# Patient Record
Sex: FEMALE | Race: WHITE | NOT HISPANIC OR LATINO | Employment: OTHER | URBAN - NONMETROPOLITAN AREA
[De-identification: names, ages, dates, MRNs, and addresses within clinical notes are randomized per-mention and may not be internally consistent; named-entity substitution may affect disease eponyms.]

---

## 2018-07-06 RX ORDER — ESTRADIOL 1 MG/1
1 TABLET ORAL DAILY
COMMUNITY
End: 2022-02-17

## 2018-07-06 RX ORDER — LANOLIN ALCOHOL/MO/W.PET/CERES
1000 CREAM (GRAM) TOPICAL DAILY
COMMUNITY

## 2018-07-06 RX ORDER — SODIUM PHOSPHATE,MONO-DIBASIC 19G-7G/118
1 ENEMA (ML) RECTAL 2 TIMES DAILY WITH MEALS
COMMUNITY
End: 2022-02-17

## 2018-07-10 ENCOUNTER — HOSPITAL ENCOUNTER (OUTPATIENT)
Facility: HOSPITAL | Age: 73
Setting detail: HOSPITAL OUTPATIENT SURGERY
Discharge: HOME | End: 2018-07-10
Attending: OPHTHALMOLOGY | Admitting: OPHTHALMOLOGY

## 2018-07-10 VITALS
DIASTOLIC BLOOD PRESSURE: 58 MMHG | SYSTOLIC BLOOD PRESSURE: 127 MMHG | HEART RATE: 74 BPM | OXYGEN SATURATION: 97 % | WEIGHT: 139 LBS | RESPIRATION RATE: 16 BRPM | BODY MASS INDEX: 25.58 KG/M2 | TEMPERATURE: 99 F | HEIGHT: 62 IN

## 2018-07-10 PROBLEM — H26.492 POSTERIOR CAPSULAR OPACIFICATION OF LEFT EYE, OBSCURING VISION: Status: ACTIVE | Noted: 2018-07-10

## 2018-07-10 PROBLEM — H26.492 POSTERIOR CAPSULAR OPACIFICATION OF LEFT EYE, OBSCURING VISION: Status: RESOLVED | Noted: 2018-07-10 | Resolved: 2018-07-10

## 2018-07-10 RX ORDER — TETRACAINE HYDROCHLORIDE 5 MG/ML
SOLUTION OPHTHALMIC AS NEEDED
Status: DISCONTINUED | OUTPATIENT
Start: 2018-07-10 | End: 2018-07-10 | Stop reason: HOSPADM

## 2018-07-10 RX ORDER — CYCLOPENTOLATE HYDROCHLORIDE 20 MG/ML
1 SOLUTION/ DROPS OPHTHALMIC ONCE
Status: COMPLETED | OUTPATIENT
Start: 2018-07-10 | End: 2018-07-10

## 2018-07-10 RX ORDER — PREDNISOLONE ACETATE 10 MG/ML
SUSPENSION/ DROPS OPHTHALMIC AS NEEDED
Status: DISCONTINUED | OUTPATIENT
Start: 2018-07-10 | End: 2018-07-10 | Stop reason: HOSPADM

## 2018-07-10 RX ORDER — BALANCED SALT SOLUTION 6.4; .75; .48; .3; 3.9; 1.7 MG/ML; MG/ML; MG/ML; MG/ML; MG/ML; MG/ML
SOLUTION OPHTHALMIC AS NEEDED
Status: DISCONTINUED | OUTPATIENT
Start: 2018-07-10 | End: 2018-07-10 | Stop reason: HOSPADM

## 2018-07-10 RX ORDER — PHENYLEPHRINE HYDROCHLORIDE 100 MG/ML
1 SOLUTION/ DROPS OPHTHALMIC ONCE
Status: COMPLETED | OUTPATIENT
Start: 2018-07-10 | End: 2018-07-10

## 2018-07-10 RX ADMIN — APRACLONIDINE HYDROCHLORIDE 1 DROP: 10 SOLUTION/ DROPS OPHTHALMIC at 13:59

## 2018-07-10 RX ADMIN — CYCLOPENTOLATE HYDROCHLORIDE 1 DROP: 20 SOLUTION/ DROPS OPHTHALMIC at 13:56

## 2018-07-10 RX ADMIN — PHENYLEPHRINE HYDROCHLORIDE 1 DROP: 100 SOLUTION/ DROPS OPHTHALMIC at 13:56

## 2018-07-10 NOTE — OP NOTE
Pre-op Diagnosis: Posterior Capsular Opacification, Left eye  Procedure: Nd: YAG Laser Capsulotomy, Left eye    Post-op Diagnosis: Posterior Capsular Opacification, Left eye    Indications:  Taniya Thomas is a 72 y.o. female with Posterior Capsular Opacification. It was recommended that the next step in her management be a Nd:YAG laser capsulotomy in the left eye.      Procedure: The left eye was dilated prior to arrival in the laser suite.  A drop of topical Tetracaine was instilled in the left conjunctival cul-de-sac and the patient was then positioned in front of the Nd:YAG laser. The correct eye was again confirmed verbally by both the patient and the surgeon.  The laser contact lens was positioned on the left eye and the capsulotomy was carried out without difficulty using the following parameters:    Laser: Nd:YAG  Spot Size: Fixed  Burst Mode: I  Power Settin.7 mJ/burst  Number of shots: 23  Total energy delivered: 117 mJ    Immediately following the procedure, the left eye was rinsed with BSS solution and a drop of prednisolone acetate 1% was applied.    The patient tolerated the procedure without difficulty. No complications were encountered.    The patient was discharged home with the appropriate instructions.    Jeri Singh MD   7/10/2018  2:37 PM

## 2018-07-17 ENCOUNTER — HOSPITAL ENCOUNTER (OUTPATIENT)
Facility: HOSPITAL | Age: 73
Setting detail: HOSPITAL OUTPATIENT SURGERY
Discharge: HOME | End: 2018-07-17
Attending: OPHTHALMOLOGY | Admitting: OPHTHALMOLOGY

## 2018-07-17 VITALS
OXYGEN SATURATION: 98 % | TEMPERATURE: 97.1 F | HEIGHT: 62 IN | BODY MASS INDEX: 25.58 KG/M2 | WEIGHT: 139 LBS | SYSTOLIC BLOOD PRESSURE: 135 MMHG | DIASTOLIC BLOOD PRESSURE: 60 MMHG | RESPIRATION RATE: 18 BRPM | HEART RATE: 67 BPM

## 2018-07-17 PROBLEM — Z96.1 PRESENCE OF INTRAOCULAR LENS: Status: ACTIVE | Noted: 2018-07-17

## 2018-07-17 PROBLEM — H26.491 OTHER SECONDARY CATARACT, RIGHT EYE: Status: ACTIVE | Noted: 2018-07-17

## 2018-07-17 PROBLEM — H26.491 OTHER SECONDARY CATARACT, RIGHT EYE: Status: RESOLVED | Noted: 2018-07-17 | Resolved: 2018-07-17

## 2018-07-17 PROBLEM — H26.492 POSTERIOR CAPSULAR OPACIFICATION OF LEFT EYE, OBSCURING VISION: Status: RESOLVED | Noted: 2018-07-10 | Resolved: 2018-07-17

## 2018-07-17 RX ORDER — TETRACAINE HYDROCHLORIDE 5 MG/ML
SOLUTION OPHTHALMIC AS NEEDED
Status: DISCONTINUED | OUTPATIENT
Start: 2018-07-17 | End: 2018-07-17 | Stop reason: HOSPADM

## 2018-07-17 RX ORDER — PHENYLEPHRINE HYDROCHLORIDE 100 MG/ML
1 SOLUTION/ DROPS OPHTHALMIC ONCE
Status: COMPLETED | OUTPATIENT
Start: 2018-07-17 | End: 2018-07-17

## 2018-07-17 RX ORDER — BALANCED SALT SOLUTION 6.4; .75; .48; .3; 3.9; 1.7 MG/ML; MG/ML; MG/ML; MG/ML; MG/ML; MG/ML
SOLUTION OPHTHALMIC AS NEEDED
Status: DISCONTINUED | OUTPATIENT
Start: 2018-07-17 | End: 2018-07-17 | Stop reason: HOSPADM

## 2018-07-17 RX ORDER — PREDNISOLONE ACETATE 10 MG/ML
SUSPENSION/ DROPS OPHTHALMIC AS NEEDED
Status: DISCONTINUED | OUTPATIENT
Start: 2018-07-17 | End: 2018-07-17 | Stop reason: HOSPADM

## 2018-07-17 RX ORDER — CYCLOPENTOLATE HYDROCHLORIDE 20 MG/ML
1 SOLUTION/ DROPS OPHTHALMIC ONCE
Status: COMPLETED | OUTPATIENT
Start: 2018-07-17 | End: 2018-07-17

## 2018-07-17 RX ADMIN — PHENYLEPHRINE HYDROCHLORIDE 1 DROP: 100 SOLUTION/ DROPS OPHTHALMIC at 14:20

## 2018-07-17 RX ADMIN — APRACLONIDINE HYDROCHLORIDE 1 DROP: 10 SOLUTION/ DROPS OPHTHALMIC at 14:29

## 2018-07-17 RX ADMIN — CYCLOPENTOLATE HYDROCHLORIDE 1 DROP: 20 SOLUTION/ DROPS OPHTHALMIC at 14:20

## 2022-01-18 ENCOUNTER — PREP FOR SURGERY (OUTPATIENT)
Dept: OTHER | Facility: HOSPITAL | Age: 77
End: 2022-01-18

## 2022-01-18 ENCOUNTER — HOSPITAL ENCOUNTER (OUTPATIENT)
Dept: GENERAL RADIOLOGY | Facility: HOSPITAL | Age: 77
Discharge: HOME | End: 2022-01-18

## 2022-01-18 ENCOUNTER — OFFICE VISIT (OUTPATIENT)
Dept: OBSTETRICS AND GYNECOLOGY | Facility: CLINIC | Age: 77
End: 2022-01-18

## 2022-01-18 ENCOUNTER — PRE-ADMISSION TESTING (OUTPATIENT)
Dept: PREADMISSION TESTING | Facility: HOSPITAL | Age: 77
End: 2022-01-18

## 2022-01-18 VITALS
WEIGHT: 146 LBS | DIASTOLIC BLOOD PRESSURE: 80 MMHG | BODY MASS INDEX: 26.87 KG/M2 | SYSTOLIC BLOOD PRESSURE: 130 MMHG | HEIGHT: 62 IN

## 2022-01-18 VITALS — HEIGHT: 62 IN | WEIGHT: 146.2 LBS | BODY MASS INDEX: 26.91 KG/M2

## 2022-01-18 DIAGNOSIS — N95.0 PMB (POSTMENOPAUSAL BLEEDING): Primary | ICD-10-CM

## 2022-01-18 DIAGNOSIS — R93.89 THICKENED ENDOMETRIUM: ICD-10-CM

## 2022-01-18 DIAGNOSIS — N95.0 PMB (POSTMENOPAUSAL BLEEDING): ICD-10-CM

## 2022-01-18 LAB
ALBUMIN SERPL-MCNC: 4.3 G/DL (ref 3.5–5.2)
ALBUMIN/GLOB SERPL: 1.3 G/DL
ALP SERPL-CCNC: 90 U/L (ref 39–117)
ALT SERPL W P-5'-P-CCNC: 15 U/L (ref 1–33)
ANION GAP SERPL CALCULATED.3IONS-SCNC: 10.4 MMOL/L (ref 5–15)
AST SERPL-CCNC: 26 U/L (ref 1–32)
BACTERIA UR QL AUTO: ABNORMAL /HPF
BASOPHILS # BLD AUTO: 0.08 10*3/MM3 (ref 0–0.2)
BASOPHILS NFR BLD AUTO: 0.7 % (ref 0–1.5)
BILIRUB SERPL-MCNC: 0.2 MG/DL (ref 0–1.2)
BILIRUB UR QL STRIP: NEGATIVE
BUN SERPL-MCNC: 16 MG/DL (ref 8–23)
BUN/CREAT SERPL: 18 (ref 7–25)
CALCIUM SPEC-SCNC: 9.9 MG/DL (ref 8.6–10.5)
CHLORIDE SERPL-SCNC: 102 MMOL/L (ref 98–107)
CLARITY UR: CLEAR
CO2 SERPL-SCNC: 25.6 MMOL/L (ref 22–29)
COLOR UR: YELLOW
CREAT SERPL-MCNC: 0.89 MG/DL (ref 0.57–1)
DEPRECATED RDW RBC AUTO: 43.5 FL (ref 37–54)
EOSINOPHIL # BLD AUTO: 0.06 10*3/MM3 (ref 0–0.4)
EOSINOPHIL NFR BLD AUTO: 0.5 % (ref 0.3–6.2)
ERYTHROCYTE [DISTWIDTH] IN BLOOD BY AUTOMATED COUNT: 13 % (ref 12.3–15.4)
GFR SERPL CREATININE-BSD FRML MDRD: 61 ML/MIN/1.73
GLOBULIN UR ELPH-MCNC: 3.4 GM/DL
GLUCOSE SERPL-MCNC: 87 MG/DL (ref 65–99)
GLUCOSE UR STRIP-MCNC: NEGATIVE MG/DL
HCT VFR BLD AUTO: 42.9 % (ref 34–46.6)
HGB BLD-MCNC: 13.7 G/DL (ref 12–15.9)
HGB UR QL STRIP.AUTO: ABNORMAL
HYALINE CASTS UR QL AUTO: ABNORMAL /LPF
IMM GRANULOCYTES # BLD AUTO: 0.05 10*3/MM3 (ref 0–0.05)
IMM GRANULOCYTES NFR BLD AUTO: 0.4 % (ref 0–0.5)
KETONES UR QL STRIP: NEGATIVE
LEUKOCYTE ESTERASE UR QL STRIP.AUTO: NEGATIVE
LYMPHOCYTES # BLD AUTO: 2.48 10*3/MM3 (ref 0.7–3.1)
LYMPHOCYTES NFR BLD AUTO: 21.6 % (ref 19.6–45.3)
MCH RBC QN AUTO: 29.1 PG (ref 26.6–33)
MCHC RBC AUTO-ENTMCNC: 31.9 G/DL (ref 31.5–35.7)
MCV RBC AUTO: 91.1 FL (ref 79–97)
MONOCYTES # BLD AUTO: 0.72 10*3/MM3 (ref 0.1–0.9)
MONOCYTES NFR BLD AUTO: 6.3 % (ref 5–12)
NEUTROPHILS NFR BLD AUTO: 70.5 % (ref 42.7–76)
NEUTROPHILS NFR BLD AUTO: 8.09 10*3/MM3 (ref 1.7–7)
NITRITE UR QL STRIP: NEGATIVE
NRBC BLD AUTO-RTO: 0 /100 WBC (ref 0–0.2)
PH UR STRIP.AUTO: <=5 [PH] (ref 5–8)
PLATELET # BLD AUTO: 373 10*3/MM3 (ref 140–450)
PMV BLD AUTO: 9 FL (ref 6–12)
POTASSIUM SERPL-SCNC: 5.5 MMOL/L (ref 3.5–5.2)
PROT SERPL-MCNC: 7.7 G/DL (ref 6–8.5)
PROT UR QL STRIP: ABNORMAL
RBC # BLD AUTO: 4.71 10*6/MM3 (ref 3.77–5.28)
RBC # UR STRIP: ABNORMAL /HPF
REF LAB TEST METHOD: ABNORMAL
SODIUM SERPL-SCNC: 138 MMOL/L (ref 136–145)
SP GR UR STRIP: 1.02 (ref 1–1.03)
SQUAMOUS #/AREA URNS HPF: ABNORMAL /HPF
UROBILINOGEN UR QL STRIP: ABNORMAL
WBC # UR STRIP: ABNORMAL /HPF
WBC NRBC COR # BLD: 11.48 10*3/MM3 (ref 3.4–10.8)

## 2022-01-18 PROCEDURE — 99204 OFFICE O/P NEW MOD 45 MIN: CPT | Performed by: OBSTETRICS & GYNECOLOGY

## 2022-01-18 PROCEDURE — 80053 COMPREHEN METABOLIC PANEL: CPT

## 2022-01-18 PROCEDURE — 36415 COLL VENOUS BLD VENIPUNCTURE: CPT

## 2022-01-18 PROCEDURE — 71046 X-RAY EXAM CHEST 2 VIEWS: CPT

## 2022-01-18 PROCEDURE — 93005 ELECTROCARDIOGRAM TRACING: CPT

## 2022-01-18 PROCEDURE — 81001 URINALYSIS AUTO W/SCOPE: CPT

## 2022-01-18 PROCEDURE — 85025 COMPLETE CBC W/AUTO DIFF WBC: CPT

## 2022-01-18 PROCEDURE — 93010 ELECTROCARDIOGRAM REPORT: CPT | Performed by: INTERNAL MEDICINE

## 2022-01-18 RX ORDER — MULTIPLE VITAMINS W/ MINERALS TAB 9MG-400MCG
1 TAB ORAL DAILY
COMMUNITY

## 2022-01-18 RX ORDER — LORATADINE 10 MG/1
10 TABLET ORAL DAILY
COMMUNITY

## 2022-01-18 RX ORDER — ASPIRIN 81 MG/1
81 TABLET ORAL DAILY
COMMUNITY

## 2022-01-18 RX ORDER — SODIUM CHLORIDE 0.9 % (FLUSH) 0.9 %
3 SYRINGE (ML) INJECTION EVERY 12 HOURS SCHEDULED
Status: CANCELLED | OUTPATIENT
Start: 2022-01-18

## 2022-01-18 RX ORDER — SODIUM CHLORIDE 0.9 % (FLUSH) 0.9 %
10 SYRINGE (ML) INJECTION AS NEEDED
Status: CANCELLED | OUTPATIENT
Start: 2022-01-18

## 2022-01-18 RX ORDER — IBUPROFEN 200 MG
200 TABLET ORAL EVERY 6 HOURS PRN
COMMUNITY
End: 2022-02-21 | Stop reason: HOSPADM

## 2022-01-18 NOTE — PAT
"Called anesthesia phone.  Juan Pablo Landin CRNA came over to [facility].  Pt to have a procedure on 1/27/22 with Dr. Wilde.  Reviewed pt's PMH with CRNA, along with preliminary EKG done in [facility] today that reads \"cannot rule out anterior infarct, age undetermined\".  Pt stated that she has never seen a cardiologist, and doesn't remember the last time that she had an EKG.  States that she does normal household chores, takes care of her animals, uses a weed eater in the summertime, and shovels snow, without any chest pain or problems.  Asked Juan Pablo if anything further needed prior to the D&C.  Juan Pablo stated that pt was ok to proceed.    "

## 2022-01-19 LAB
QT INTERVAL: 394 MS
QTC INTERVAL: 445 MS

## 2022-01-24 ENCOUNTER — LAB (OUTPATIENT)
Dept: LAB | Facility: HOSPITAL | Age: 77
End: 2022-01-24

## 2022-01-24 ENCOUNTER — TRANSCRIBE ORDERS (OUTPATIENT)
Dept: LAB | Facility: HOSPITAL | Age: 77
End: 2022-01-24

## 2022-01-24 DIAGNOSIS — Z11.59 SCREENING EXAMINATION FOR OTHER ARTHROPOD-BORNE VIRAL DISEASES: Primary | ICD-10-CM

## 2022-01-24 DIAGNOSIS — Z11.59 SCREENING EXAMINATION FOR OTHER ARTHROPOD-BORNE VIRAL DISEASES: ICD-10-CM

## 2022-01-24 LAB — SARS-COV-2 RNA PNL SPEC NAA+PROBE: NOT DETECTED

## 2022-01-24 PROCEDURE — U0004 COV-19 TEST NON-CDC HGH THRU: HCPCS

## 2022-01-24 PROCEDURE — C9803 HOPD COVID-19 SPEC COLLECT: HCPCS

## 2022-01-24 PROCEDURE — U0005 INFEC AGEN DETEC AMPLI PROBE: HCPCS

## 2022-01-28 NOTE — PROGRESS NOTES
"GYN Office Visit    Subjective   Chief Complaint   Patient presents with   • Consult     Patient complains of PMB and cramping that started about 3 weeks ago.   • Vaginal Bleeding     Taniya Thomas is a 76 y.o. year old  presenting to be seen for postmenopausal bleeding.    She reports bleeding that started 3 weeks ago.  Bleeding is moderate to heavy.  She does have cramping back pain.  She has been using unopposed estrogen for several years now.    OB Hx: 2 vaginal births  Pap smear:   Mammogram:     Past Medical History:   Diagnosis Date   • Arthritis     OA   • Body piercing     \"EARS ARE GROWN UP\"   • Cancer (HCC)     SKIN CANCER REMOVED FROM FACE   • Cataract, bilateral     s/p surgery   • GERD (gastroesophageal reflux disease)    • Hearing loss     bilateral hearing aids   • History of bronchitis    • History of cardiovascular stress test     REPORTS SEVERAL TEST. LAST DONE 25-30 YEARS AGO OR LONGER   • History of migraine    • Irregular heart beat     states had an episode in the summer of  that her heart was racing, skipping beats, and vision went black.  stated it didn't last long and she never sought treatment.    • Migraine    • Osteoarthritis    • Ovarian cyst    • PONV (postoperative nausea and vomiting)    • Recurrent pregnancy loss, antepartum condition or complication     2   • Seasonal allergies    • Wears dentures     upper plate    • Wears glasses     FOR READING       Past Surgical History:   Procedure Laterality Date   • APPENDECTOMY     • BREAST BIOPSY     • BREAST SURGERY Bilateral     BIOPSIES - benign   • COLONOSCOPY     • DILATATION AND CURETTAGE      REPORTS SEVERAL IN THE PAST   • EYE CAPSULOTOMY WITH LASER Left 7/10/2018    Procedure: EYE CAPSULOTOMY WITH LASER LEFT;  Surgeon: Jeri Singh MD;  Location: [facility];  Service: Ophthalmology   • EYE CAPSULOTOMY WITH LASER Right 2018    Procedure: EYE CAPSULOTOMY WITH LASER RIGHT;  Surgeon: Jeri" "Rayna Singh MD;  Location: [facility] OR;  Service: Ophthalmology   • EYE SURGERY Bilateral     CATARACT EXTRACTIONS   • MOUTH SURGERY      ORAL EXTRACTIONS   • SALPINGO OOPHORECTOMY Left    • SKIN BIOPSY      REPORTS REMOVED FROM FACE       Family History   Problem Relation Age of Onset   • Stroke Father    • Hypertension Father    • Skin cancer Father    • Heart disease Mother    • Hypertension Mother    • Hypertension Brother    • Lung cancer Brother    • Hypertension Sister    • Cervical cancer Maternal Aunt         Social History     Tobacco Use   • Smoking status: Never Smoker   • Smokeless tobacco: Never Used   Vaping Use   • Vaping Use: Never used   Substance Use Topics   • Alcohol use: No   • Drug use: No       (Not in a hospital admission)      Codeine and Darvon [propoxyphene]    Current Outpatient Medications on File Prior to Visit   Medication Sig Dispense Refill   • estradiol (ESTRACE) 1 MG tablet Take 1 mg by mouth Daily.     • multivitamin with minerals (CENTRUM SILVER 50+WOMEN PO) Take 1 tablet by mouth Daily.     • vitamin B-12 (CYANOCOBALAMIN) 1000 MCG tablet Take 1,000 mcg by mouth Daily.     • glucosamine-chondroitin 500-400 MG capsule capsule Take 1 capsule by mouth 2 (Two) Times a Day With Meals.     • Prenatal Multivit-Min-Fe-FA (PRENATAL VITAMINS PO) Take 1 tablet by mouth Daily.       No current facility-administered medications on file prior to visit.       Social History    Tobacco Use      Smoking status: Never Smoker      Smokeless tobacco: Never Used         Objective   /80   Ht 156.8 cm (61.75\")   Wt 66.2 kg (146 lb)   LMP  (LMP Unknown)   BMI 26.92 kg/m²     Physical Exam:  General Appearance: alert, pleasant, appears stated age, interactive and cooperative         Medical Decision Making:    I reviewed her pelvic ultrasound from today.    Normal sized, anteverted uterus with multiple, small < 13 mm fibroids present.  Uterine length measures 7.4 cm.  Endometrium is " thickened measuring 8 mm.  Previous LSO.  Right ovary is not well-visualized.  No free fluid in the cul-de-sac.    Assessment   Postmenopausal bleeding  Thickened endometrium  Unopposed estrogen     Plan    Orders Placed This Encounter   Procedures   • US Non-ob Transvaginal     Order Specific Question:   Reason for Exam:     Answer:   PMB       Medication Management: Stop estrogen    Procedures Performed: None    We reviewed her situation in detail today.  We reviewed her ultrasound findings.  We discussed the need for endometrial sampling to rule out hyperplasia/malignancy.  We discussed in-office endometrial biopsy versus hysteroscopic evaluation in the OR under sedation.  After reviewing the risks and benefits of each approach, the patient opted for hysteroscopy, D&C under sedation in the OR.  Risks for the procedure were reviewed today.  Stop unopposed estrogen use.  All questions answered.    Jeyson Wilde MD  Obstetrics and Gynecology  [facility]

## 2022-02-17 ENCOUNTER — PRE-ADMISSION TESTING (OUTPATIENT)
Dept: PREADMISSION TESTING | Facility: HOSPITAL | Age: 77
End: 2022-02-17

## 2022-02-17 VITALS — BODY MASS INDEX: 27.2 KG/M2 | HEIGHT: 62 IN | WEIGHT: 147.8 LBS

## 2022-02-17 LAB
BASOPHILS # BLD AUTO: 0.05 10*3/MM3 (ref 0–0.2)
BASOPHILS NFR BLD AUTO: 0.6 % (ref 0–1.5)
BILIRUB UR QL STRIP: NEGATIVE
CLARITY UR: CLEAR
COLOR UR: YELLOW
DEPRECATED RDW RBC AUTO: 41.1 FL (ref 37–54)
EOSINOPHIL # BLD AUTO: 0.11 10*3/MM3 (ref 0–0.4)
EOSINOPHIL NFR BLD AUTO: 1.3 % (ref 0.3–6.2)
ERYTHROCYTE [DISTWIDTH] IN BLOOD BY AUTOMATED COUNT: 12.6 % (ref 12.3–15.4)
GLUCOSE UR STRIP-MCNC: NEGATIVE MG/DL
HCT VFR BLD AUTO: 41.4 % (ref 34–46.6)
HGB BLD-MCNC: 13.4 G/DL (ref 12–15.9)
HGB UR QL STRIP.AUTO: NEGATIVE
IMM GRANULOCYTES # BLD AUTO: 0.02 10*3/MM3 (ref 0–0.05)
IMM GRANULOCYTES NFR BLD AUTO: 0.2 % (ref 0–0.5)
KETONES UR QL STRIP: NEGATIVE
LEUKOCYTE ESTERASE UR QL STRIP.AUTO: NEGATIVE
LYMPHOCYTES # BLD AUTO: 3.24 10*3/MM3 (ref 0.7–3.1)
LYMPHOCYTES NFR BLD AUTO: 38.2 % (ref 19.6–45.3)
MCH RBC QN AUTO: 29.1 PG (ref 26.6–33)
MCHC RBC AUTO-ENTMCNC: 32.4 G/DL (ref 31.5–35.7)
MCV RBC AUTO: 89.8 FL (ref 79–97)
MONOCYTES # BLD AUTO: 0.82 10*3/MM3 (ref 0.1–0.9)
MONOCYTES NFR BLD AUTO: 9.7 % (ref 5–12)
NEUTROPHILS NFR BLD AUTO: 4.25 10*3/MM3 (ref 1.7–7)
NEUTROPHILS NFR BLD AUTO: 50 % (ref 42.7–76)
NITRITE UR QL STRIP: NEGATIVE
NRBC BLD AUTO-RTO: 0 /100 WBC (ref 0–0.2)
PH UR STRIP.AUTO: 5.5 [PH] (ref 5–8)
PLATELET # BLD AUTO: 353 10*3/MM3 (ref 140–450)
PMV BLD AUTO: 9 FL (ref 6–12)
PROT UR QL STRIP: NEGATIVE
RBC # BLD AUTO: 4.61 10*6/MM3 (ref 3.77–5.28)
SP GR UR STRIP: 1.02 (ref 1–1.03)
UROBILINOGEN UR QL STRIP: NORMAL
WBC NRBC COR # BLD: 8.49 10*3/MM3 (ref 3.4–10.8)

## 2022-02-17 PROCEDURE — 36415 COLL VENOUS BLD VENIPUNCTURE: CPT

## 2022-02-17 PROCEDURE — 85025 COMPLETE CBC W/AUTO DIFF WBC: CPT

## 2022-02-17 PROCEDURE — 81003 URINALYSIS AUTO W/O SCOPE: CPT

## 2022-02-17 NOTE — DISCHARGE INSTRUCTIONS
PAT phone history completed with pt for upcoming procedure on      PAT PASS GIVEN/REVIEWED WITH PT.  VERBALIZED UNDERSTANDING OF THE FOLLOWING:  DO NOT EAT, DRINK, SMOKE, USE SMOKELESS TOBACCO OR CHEW GUM AFTER MIDNIGHT THE NIGHT BEFORE SURGERY.  THIS ALSO INCLUDES HARD CANDIES AND MINTS.    DO NOT SHAVE THE AREA TO BE OPERATED ON AT LEAST 48 HOURS PRIOR TO THE PROCEDURE.  DO NOT WEAR MAKE UP OR NAIL POLISH.  DO NOT LEAVE IN ANY PIERCING OR WEAR JEWELRY THE DAY OF SURGERY.      DO NOT USE ADHESIVES IF YOU WEAR DENTURES.    DO NOT WEAR EYE CONTACTS; BRING IN YOUR GLASSES.    ONLY TAKE MEDICATION THE MORNING OF YOUR PROCEDURE IF INSTRUCTED BY YOUR SURGEON WITH ENOUGH WATER TO SWALLOW THE MEDICATION.  IF YOUR SURGEON DID NOT SPECIFY WHICH MEDICATIONS TO TAKE, YOU WILL NEED TO CALL THEIR OFFICE FOR FURTHER INSTRUCTIONS AND DO AS THEY INSTRUCT.    LEAVE ANYTHING YOU CONSIDER VALUABLE AT HOME.    YOU WILL NEED TO ARRANGE FOR SOMEONE TO DRIVE YOU HOME AFTER SURGERY.  IT IS RECOMMENDED THAT YOU DO NOT DRIVE, WORK, DRINK ALCOHOL OR MAKE MAJOR DECISIONS FOR AT LEAST 24 HOURS AFTER YOUR PROCEDURE IS COMPLETE.      THE DAY OF YOUR PROCEDURE, BRING IN THE FOLLOWING IF APPLICABLE:   PICTURE ID AND INSURANCE/MEDICARE OR MEDICAID CARDS/ANY CO-PAY THAT MAY BE DUE   COPY OF ADVANCED DIRECTIVE/LIVING WILL/POWER OR    CPAP/BIPAP/INHALERS   SKIN PREP SHEET   YOUR PREADMISSION TESTING PASS (IF NOT A PHONE HISTORY)

## 2022-02-21 ENCOUNTER — ANESTHESIA (OUTPATIENT)
Dept: PERIOP | Facility: HOSPITAL | Age: 77
End: 2022-02-21

## 2022-02-21 ENCOUNTER — ANESTHESIA EVENT (OUTPATIENT)
Dept: PERIOP | Facility: HOSPITAL | Age: 77
End: 2022-02-21

## 2022-02-21 ENCOUNTER — HOSPITAL ENCOUNTER (OUTPATIENT)
Facility: HOSPITAL | Age: 77
Setting detail: HOSPITAL OUTPATIENT SURGERY
Discharge: HOME | End: 2022-02-21
Attending: OBSTETRICS & GYNECOLOGY | Admitting: OBSTETRICS & GYNECOLOGY

## 2022-02-21 VITALS
SYSTOLIC BLOOD PRESSURE: 135 MMHG | RESPIRATION RATE: 16 BRPM | OXYGEN SATURATION: 97 % | HEART RATE: 87 BPM | TEMPERATURE: 97.4 F | DIASTOLIC BLOOD PRESSURE: 57 MMHG

## 2022-02-21 DIAGNOSIS — N95.0 PMB (POSTMENOPAUSAL BLEEDING): ICD-10-CM

## 2022-02-21 DIAGNOSIS — R93.89 THICKENED ENDOMETRIUM: ICD-10-CM

## 2022-02-21 PROCEDURE — 0 MEPERIDINE PER 100 MG: Performed by: NURSE ANESTHETIST, CERTIFIED REGISTERED

## 2022-02-21 PROCEDURE — 25010000002 ONDANSETRON PER 1 MG: Performed by: NURSE ANESTHETIST, CERTIFIED REGISTERED

## 2022-02-21 PROCEDURE — 25010000002 PROPOFOL 10 MG/ML EMULSION: Performed by: NURSE ANESTHETIST, CERTIFIED REGISTERED

## 2022-02-21 PROCEDURE — 58558 HYSTEROSCOPY BIOPSY: CPT | Performed by: OBSTETRICS & GYNECOLOGY

## 2022-02-21 PROCEDURE — S0260 H&P FOR SURGERY: HCPCS | Performed by: OBSTETRICS & GYNECOLOGY

## 2022-02-21 PROCEDURE — 88305 TISSUE EXAM BY PATHOLOGIST: CPT | Performed by: OBSTETRICS & GYNECOLOGY

## 2022-02-21 PROCEDURE — C1782 MORCELLATOR: HCPCS | Performed by: OBSTETRICS & GYNECOLOGY

## 2022-02-21 PROCEDURE — 25010000002 SUCCINYLCHOLINE PER 20 MG: Performed by: NURSE ANESTHETIST, CERTIFIED REGISTERED

## 2022-02-21 RX ORDER — HYDROCODONE BITARTRATE AND ACETAMINOPHEN 5; 325 MG/1; MG/1
1 TABLET ORAL ONCE AS NEEDED
Status: DISCONTINUED | OUTPATIENT
Start: 2022-02-21 | End: 2022-02-21 | Stop reason: HOSPADM

## 2022-02-21 RX ORDER — ONDANSETRON 2 MG/ML
INJECTION INTRAMUSCULAR; INTRAVENOUS AS NEEDED
Status: DISCONTINUED | OUTPATIENT
Start: 2022-02-21 | End: 2022-02-21 | Stop reason: SURG

## 2022-02-21 RX ORDER — SODIUM CHLORIDE 0.9 % (FLUSH) 0.9 %
3 SYRINGE (ML) INJECTION EVERY 12 HOURS SCHEDULED
Status: DISCONTINUED | OUTPATIENT
Start: 2022-02-21 | End: 2022-02-21 | Stop reason: HOSPADM

## 2022-02-21 RX ORDER — SODIUM CHLORIDE, SODIUM LACTATE, POTASSIUM CHLORIDE, CALCIUM CHLORIDE 600; 310; 30; 20 MG/100ML; MG/100ML; MG/100ML; MG/100ML
1000 INJECTION, SOLUTION INTRAVENOUS CONTINUOUS
Status: DISCONTINUED | OUTPATIENT
Start: 2022-02-21 | End: 2022-02-21 | Stop reason: HOSPADM

## 2022-02-21 RX ORDER — ACETAMINOPHEN 500 MG
1000 TABLET ORAL EVERY 8 HOURS PRN
Qty: 60 TABLET | Refills: 0 | Status: SHIPPED | OUTPATIENT
Start: 2022-02-21 | End: 2023-02-21

## 2022-02-21 RX ORDER — IBUPROFEN 600 MG/1
600 TABLET ORAL EVERY 6 HOURS PRN
Status: DISCONTINUED | OUTPATIENT
Start: 2022-02-21 | End: 2022-02-21 | Stop reason: HOSPADM

## 2022-02-21 RX ORDER — MEPERIDINE HYDROCHLORIDE 25 MG/ML
12.5 INJECTION INTRAMUSCULAR; INTRAVENOUS; SUBCUTANEOUS ONCE
Status: COMPLETED | OUTPATIENT
Start: 2022-02-21 | End: 2022-02-21

## 2022-02-21 RX ORDER — IBUPROFEN 800 MG/1
800 TABLET ORAL EVERY 8 HOURS PRN
Qty: 30 TABLET | Refills: 0 | Status: SHIPPED | OUTPATIENT
Start: 2022-02-21

## 2022-02-21 RX ORDER — SODIUM CHLORIDE 0.9 % (FLUSH) 0.9 %
10 SYRINGE (ML) INJECTION AS NEEDED
Status: DISCONTINUED | OUTPATIENT
Start: 2022-02-21 | End: 2022-02-21 | Stop reason: HOSPADM

## 2022-02-21 RX ORDER — SODIUM CHLORIDE 9 MG/ML
INJECTION, SOLUTION INTRAVENOUS CONTINUOUS PRN
Status: COMPLETED | OUTPATIENT
Start: 2022-02-21 | End: 2022-02-21

## 2022-02-21 RX ORDER — PROPOFOL 10 MG/ML
VIAL (ML) INTRAVENOUS AS NEEDED
Status: DISCONTINUED | OUTPATIENT
Start: 2022-02-21 | End: 2022-02-21 | Stop reason: SURG

## 2022-02-21 RX ORDER — LIDOCAINE HYDROCHLORIDE AND EPINEPHRINE BITARTRATE 20; .01 MG/ML; MG/ML
INJECTION, SOLUTION SUBCUTANEOUS AS NEEDED
Status: DISCONTINUED | OUTPATIENT
Start: 2022-02-21 | End: 2022-02-21 | Stop reason: HOSPADM

## 2022-02-21 RX ORDER — HYDROCODONE BITARTRATE AND ACETAMINOPHEN 5; 325 MG/1; MG/1
2 TABLET ORAL ONCE AS NEEDED
Status: CANCELLED | OUTPATIENT
Start: 2022-02-21

## 2022-02-21 RX ORDER — KETAMINE HYDROCHLORIDE 50 MG/ML
INJECTION, SOLUTION, CONCENTRATE INTRAMUSCULAR; INTRAVENOUS AS NEEDED
Status: DISCONTINUED | OUTPATIENT
Start: 2022-02-21 | End: 2022-02-21 | Stop reason: SURG

## 2022-02-21 RX ORDER — GABAPENTIN 300 MG/1
600 CAPSULE ORAL ONCE
Status: CANCELLED | OUTPATIENT
Start: 2022-02-21 | End: 2022-02-21

## 2022-02-21 RX ORDER — SUCCINYLCHOLINE CHLORIDE 20 MG/ML
INJECTION INTRAMUSCULAR; INTRAVENOUS AS NEEDED
Status: DISCONTINUED | OUTPATIENT
Start: 2022-02-21 | End: 2022-02-21 | Stop reason: SURG

## 2022-02-21 RX ADMIN — PROPOFOL 50 MG: 10 INJECTION, EMULSION INTRAVENOUS at 12:08

## 2022-02-21 RX ADMIN — PROPOFOL 50 MG: 10 INJECTION, EMULSION INTRAVENOUS at 11:59

## 2022-02-21 RX ADMIN — ONDANSETRON 4 MG: 2 INJECTION INTRAMUSCULAR; INTRAVENOUS at 12:08

## 2022-02-21 RX ADMIN — PROPOFOL 50 MG: 10 INJECTION, EMULSION INTRAVENOUS at 12:30

## 2022-02-21 RX ADMIN — PROPOFOL 75 MCG/KG/MIN: 10 INJECTION, EMULSION INTRAVENOUS at 11:59

## 2022-02-21 RX ADMIN — SUCCINYLCHOLINE CHLORIDE 100 MG: 20 INJECTION, SOLUTION INTRAMUSCULAR; INTRAVENOUS at 12:35

## 2022-02-21 RX ADMIN — PROPOFOL 100 MG: 10 INJECTION, EMULSION INTRAVENOUS at 12:35

## 2022-02-21 RX ADMIN — KETAMINE HYDROCHLORIDE 20 MG: 50 INJECTION, SOLUTION INTRAMUSCULAR; INTRAVENOUS at 12:08

## 2022-02-21 RX ADMIN — MEPERIDINE HYDROCHLORIDE 12.5 MG: 25 INJECTION, SOLUTION INTRAMUSCULAR; INTRAVENOUS; SUBCUTANEOUS at 13:12

## 2022-02-21 RX ADMIN — PROPOFOL 50 MG: 10 INJECTION, EMULSION INTRAVENOUS at 12:02

## 2022-02-21 RX ADMIN — SODIUM CHLORIDE, POTASSIUM CHLORIDE, SODIUM LACTATE AND CALCIUM CHLORIDE: 600; 310; 30; 20 INJECTION, SOLUTION INTRAVENOUS at 11:59

## 2022-02-21 NOTE — ANESTHESIA POSTPROCEDURE EVALUATION
Patient: Taniya HOBBS Shafto    Procedure Summary     Date: 02/21/22 Room / Location: [facility] OR 2 /  [facility]    Anesthesia Start: 1149 Anesthesia Stop: 1254    Procedure: HYSTEROSCOPY W DILATION AND CURETTAGE (N/A Vagina) Diagnosis:       PMB (postmenopausal bleeding)      Thickened endometrium      (PMB (postmenopausal bleeding) [N95.0])      (Thickened endometrium [R93.89])    Surgeons: Jeyson Wilde MD Provider: Ramón Josue CRNA    Anesthesia Type: MAC ASA Status: 3          Anesthesia Type: MAC    Vitals  Temp 97  HR 76  Sat 100  Resp 18  /67  Resp 20      Post Anesthesia Care and Evaluation    Patient location during evaluation: PACU  Patient participation: complete - patient participated  Level of consciousness: awake and alert and sleepy but conscious  Pain management: satisfactory to patient  Airway patency: patent  Anesthetic complications: No anesthetic complications    Cardiovascular status: acceptable and stable  Respiratory status: acceptable and face mask  Hydration status: acceptable

## 2022-02-21 NOTE — OP NOTE
BRUCE Jeremy Thomas  : 1945  MRN: [id number]  CSN: [id number]  Date: 2022    Operative Report    Pre-op Diagnosis:  PMB (postmenopausal bleeding) [N95.0]  Thickened endometrium [R93.89]   Post-op Diagnosis:  Same  Cervical stenosis   Procedure: HYSTEROSCOPY  DILATION AND CURETTAGE   Surgeon:  Surgical assistant: NOA Bishop was responsible for performing the following activities: Retraction and Manipulation of hysteroscopic instruments and their skilled assistance was necessary for the success of this case.     OR Staff: Circulator: Kaylee Starr RN  Scrub Person: Isidra Lagunas CSA     Anesthesia: Sedation   Estimated Blood Loss: 5 mls   ABx: none   Specimens:  Endometrial curettings       Complications: None           Findings:   Normal external genitalia and vaginal vault. Normal-appearing cervix.  Significant cervical stenosis. Cervical canal was normal in appearance. Anteverted uterus that sounded to 7 cm. Thin endometrium. Neither tubal ostia was well visualized.     Description of Procedure:   Following confirmation of informed consent, the patient was taken to the operating room where her anesthesia was obtained without difficulty. She was prepped and draped in the usual sterile fashion in the dorsal lithotomy position. A surgical timeout for safety was performed and agreed upon by all team members. A heavy-weighted speculum and Arthur retractor were placed into her vagina and the cervix was visualized. A paracervical block was performed using 1% lidocaine with epinephrine. A long Allis clamp was used to grasp the anterior lip of the cervix. Gentle traction was applied and the cervix was gently dilated with sterile dilators to allow for entrance of a diagnostic hysteroscope.  Pediatric dilators were required given significant stenosis. The diagnostic hysteroscope was then gently advanced to the uterine fundus and the above findings were noted. The hysteroscope was then  removed and a sharp curettage was performed using a non-serrated curette until a gritty texture was noted throughout. All endometrial curettings were sent to pathology for review. At the end of the procedure, excellent hemostasis was noted and all instruments were removed from the vagina. All counts were correct per the OR staff. The patient was awakened and taken to the recovery room in stable condition.    Jeyson Wilde MD  Obstetrics and Gynecology  [facility]

## 2022-02-21 NOTE — ANESTHESIA PROCEDURE NOTES
Airway  Urgency: elective    Date/Time: 2/21/2022 12:36 PM  Airway not difficult    General Information and Staff    Patient location during procedure: OR  CRNA: Ramón Josue CRNA    Indications and Patient Condition  Indications for airway management: airway protection    Preoxygenated: yes      Final Airway Details  Final airway type: endotracheal airway      Successful airway: ETT  Cuffed: yes   Successful intubation technique: direct laryngoscopy and RSI  Endotracheal tube insertion site: oral  Blade: Beverly  Blade size: 2  ETT size (mm): 7.0  Cormack-Lehane Classification: grade I - full view of glottis  Placement verified by: chest auscultation and capnometry   Measured from: lips  ETT/EBT  to lips (cm): 21  Number of attempts at approach: 1    Additional Comments  Dentition and Lips as preoperative assesment

## 2022-02-21 NOTE — H&P
"GYNECOLOGY HISTORY AND PHYSICAL    CHIEF COMPLAINT: Scheduled surgery    DIAGNOSIS:  Postmenopausal bleeding  Thickened endometrium  Unopposed estrogen    ASSESSMENT/PLAN     76 y.o.  female who presents for scheduled hysteroscopy with possible Myosure, D&C and all other indicated procedures.    - Proceed to the OR for scheduled surgery  - Risks for the procedure reviewed  - SCDs for DVT ppx  - Antibiotics: None    HISTORY OF PRESENT ILLNESS     76 y.o.  female who presents for the scheduled procedure listed above.    She has no complaints today and there are no interval changes to the history.    REVIEW OF SYSTEMS: A complete review of systems was performed and was specifically negative for headache, changes in vision, RUQ pain, shortness of breath, chest pain, lower extremity edema and dysuria.     HISTORY:  Obstetrical History:  OB History    Para Term  AB Living   2       2 0   SAB IAB Ectopic Molar Multiple Live Births   2 0       0      # Outcome Date GA Lbr Melecio/2nd Weight Sex Delivery Anes PTL Lv   2 SAB            1 SAB                Past Medical History:  Past Medical History:   Diagnosis Date   • Arthritis     OA   • Body piercing     \"EARS ARE GROWN UP\"   • Cancer (HCC)     SKIN CANCER REMOVED FROM FACE   • Cataract, bilateral     s/p surgery   • GERD (gastroesophageal reflux disease)    • Hearing loss     bilateral hearing aids   • History of bronchitis    • History of cardiovascular stress test     REPORTS SEVERAL TEST. LAST DONE 25-30 YEARS AGO OR LONGER   • History of migraine    • Irregular heart beat     states had an episode in the summer of  that her heart was racing, skipping beats, and vision went black.  stated it didn't last long and she never sought treatment.    • Migraine    • Osteoarthritis    • Ovarian cyst    • PONV (postoperative nausea and vomiting)    • Recurrent pregnancy loss, antepartum condition or complication     2   • Seasonal allergies    • " "Urinary, incontinence, stress female    • Wears dentures     upper plate    • Wears glasses     FOR READING       Past Surgical History:  Past Surgical History:   Procedure Laterality Date   • APPENDECTOMY     • BREAST BIOPSY     • BREAST SURGERY Bilateral     BIOPSIES - benign   • COLONOSCOPY     • DILATATION AND CURETTAGE      REPORTS SEVERAL IN THE PAST   • EYE CAPSULOTOMY WITH LASER Left 7/10/2018    Procedure: EYE CAPSULOTOMY WITH LASER LEFT;  Surgeon: Jeri Singh MD;  Location: [facility];  Service: Ophthalmology   • EYE CAPSULOTOMY WITH LASER Right 7/17/2018    Procedure: EYE CAPSULOTOMY WITH LASER RIGHT;  Surgeon: Jeri Singh MD;  Location: [facility];  Service: Ophthalmology   • EYE SURGERY Bilateral     CATARACT EXTRACTIONS   • MOUTH SURGERY      ORAL EXTRACTIONS   • SALPINGO OOPHORECTOMY Left    • SKIN BIOPSY      REPORTS REMOVED FROM FACE       Social History:  Social History     Tobacco Use   • Smoking status: Never Smoker   • Smokeless tobacco: Never Used   Vaping Use   • Vaping Use: Never used   Substance Use Topics   • Alcohol use: No   • Drug use: No       Family History  Family History   Problem Relation Age of Onset   • Stroke Father    • Hypertension Father    • Skin cancer Father    • Heart disease Mother    • Hypertension Mother    • Hypertension Brother    • Lung cancer Brother    • Hypertension Sister    • Cervical cancer Maternal Aunt         Allergies:   Allergies   Allergen Reactions   • Codeine Nausea And Vomiting   • Darvon [Propoxyphene] Other (See Comments)     Pt states \"it makes me feel weird\".  States \"spaces me out\"       OBJECTIVE   VITALS:  Temp:  [97.7 °F (36.5 °C)] 97.7 °F (36.5 °C)  Heart Rate:  [78] 78  Resp:  [16] 16  BP: (132)/(59) 132/59    PHYSICAL EXAM:  GENERAL: NAD, alert  CHEST: No increased work of breathing, CTAB  CV: RRR, WWP  ABDOMEN: Soft, NTTP  EXTREMITIES:  Warm and well-perfused, nontender, nonedematous  NEURO: AAO x 3, CN II-XII " grossly intact    No current facility-administered medications on file prior to encounter.     Current Outpatient Medications on File Prior to Encounter   Medication Sig Dispense Refill   • aspirin 81 MG EC tablet Take 81 mg by mouth Daily.     • ibuprofen (ADVIL,MOTRIN) 200 MG tablet Take 200 mg by mouth Every 6 (Six) Hours As Needed for Mild Pain .     • loratadine (Claritin) 10 MG tablet Take 10 mg by mouth Daily.     • multivitamin with minerals (CENTRUM SILVER 50+WOMEN PO) Take 1 tablet by mouth Daily.     • vitamin B-12 (CYANOCOBALAMIN) 1000 MCG tablet Take 1,000 mcg by mouth Daily.         DIAGNOSTIC STUDIES:  Results from last 7 days   Lab Units 02/17/22  1438   WBC 10*3/mm3 8.49   HEMOGLOBIN g/dL 13.4   HEMATOCRIT % 41.4   PLATELETS 10*3/mm3 353       No results found for this or any previous visit (from the past 24 hour(s)).    Jeyson Wilde MD  Obstetrics and Gynecology  [facility]

## 2022-02-21 NOTE — DISCHARGE INSTRUCTIONS
Pelvic Surgery  Home Care Instructions:  Dr. Jeyson Wilde      Thank you for choosing [facility]. Please let us know if you have questions or concerns or do not understand the information we give you. Always ask us to explain words or phrases you do not understand.    You have had pelvic surgery. Here are some basic guidelines to help you recover more quickly at home. Your doctor may give you more guidelines. If you have any questions after you go home, please call the numbers listed on the next page.    General Guidelines    1. You may resume normal daily activities.  2. Do not put anything in the vagina (no tampons or douching).    3.   Do not have sex until cleared by your physician.  4.   You may climb steps.  5. You may bathe or shower.  6. The only exercise you should do is walking. This is important for your recovery.  7. Do not drive while taking narcotics.  8. Take the medicines you were taking before surgery. Other medicines you need to take at home are:    Alternate Motrin and Tylenol around the clock. Take each every 8 hours in alternation so that you are getting one of the medications every 4 hours.      Metamucil + Colace daily to keep bowel movements soft        If you have questions about your medicines, let us know. Or, talk to your local pharmacist.    9. Surgery, lack of activity, pain medicines and iron pills tend to cause constipation. Take something every day to prevent constipation and straining.  Taking something like Metamucil® every day to increase fiber may prevent constipation. Follow the instructions on the container. If you need a gentle laxative, then something like Milk of Magnesia® or Correctol® work fairly well. You should not need to take laxatives often.    10. Call your doctor if you have:     a. Fever of 101 degrees or higher.  b. Heavy vaginal bleeding.  c. Worsening nausea or pain.  d. Other problems or concern.    If you have any questions or concerns about  your usual routines, please talk to the nurse or doctor.       To talk with your doctor at [facility], call:  Obstetrics and Gynecology Outpatient Clinic  Phone: [phone]      We appreciate the opportunity you have given us to participate in your care.      Please follow all post op instructions and follow up appointment time from your physician's office included in your discharge packet.    REST TODAY    Pelvic rest is best described as not putting anything in your vagina. This includes tampons, douching, tub bathing or sexual activity.    No pushing, pulling, tugging,  heavy lifting, or strenuous activity.  No major decision making, driving, or drinking alcoholic beverages for 24 hours. ( due to the medications you have  received)  Always use good hand hygiene/washing techniques.  NO driving while taking pain medications.    To assist you in voiding:  Drink plenty of fluids  Listen to running water while attempting to void.    If you are unable to urinate and you have an uncomfortable urge to void or it has been   6 hours since you were discharged, return to the Emergency Room   5

## 2022-02-21 NOTE — ANESTHESIA PREPROCEDURE EVALUATION
Anesthesia Evaluation     Patient summary reviewed and Nursing notes reviewed   history of anesthetic complications: PONV  NPO Solid Status: > 8 hours  NPO Liquid Status: > 8 hours           Airway   Mallampati: II  TM distance: >3 FB  Neck ROM: full  no difficulty expected  Dental - normal exam     Pulmonary - negative pulmonary ROS and normal exam   Cardiovascular - negative cardio ROS and normal exam    Rhythm: regular  Rate: normal        Neuro/Psych  (+) headaches,    GI/Hepatic/Renal/Endo    (+)  GERD,      Musculoskeletal     Abdominal    Substance History - negative use     OB/GYN negative ob/gyn ROS         Other   arthritis,    history of cancer    ROS/Med Hx Other: Hx skin cancer.                  Anesthesia Plan    ASA 3     MAC   (Pt told that intravenous sedation will be used as the primary anesthetic along with local anesthesia if necessary. Every effort will be made to make sure the patient is comfortable.     The patient was told they may or may not have recall for the procedure. It was further explained that if the MAC was not adequate that a general anesthetic with either an LMA or endotracheal tube would be required.     Will proceed with the plan of care.)  intravenous induction     Anesthetic plan, all risks, benefits, and alternatives have been provided, discussed and informed consent has been obtained with: patient.        CODE STATUS:

## 2022-02-23 ENCOUNTER — HOSPITAL ENCOUNTER (EMERGENCY)
Facility: HOSPITAL | Age: 77
Discharge: HOME | End: 2022-02-23
Attending: EMERGENCY MEDICINE | Admitting: EMERGENCY MEDICINE

## 2022-02-23 VITALS
HEART RATE: 81 BPM | WEIGHT: 140 LBS | HEIGHT: 61 IN | TEMPERATURE: 98.3 F | DIASTOLIC BLOOD PRESSURE: 85 MMHG | RESPIRATION RATE: 18 BRPM | BODY MASS INDEX: 26.43 KG/M2 | SYSTOLIC BLOOD PRESSURE: 165 MMHG | OXYGEN SATURATION: 97 %

## 2022-02-23 DIAGNOSIS — K12.2 UVULITIS: Primary | ICD-10-CM

## 2022-02-23 DIAGNOSIS — T88.59XA COMPLICATION OF ANESTHESIA, INITIAL ENCOUNTER: ICD-10-CM

## 2022-02-23 LAB — S PYO AG THROAT QL: NEGATIVE

## 2022-02-23 PROCEDURE — 96372 THER/PROPH/DIAG INJ SC/IM: CPT

## 2022-02-23 PROCEDURE — 99283 EMERGENCY DEPT VISIT LOW MDM: CPT

## 2022-02-23 PROCEDURE — 87081 CULTURE SCREEN ONLY: CPT | Performed by: PHYSICIAN ASSISTANT

## 2022-02-23 PROCEDURE — 25010000002 DEXAMETHASONE SODIUM PHOSPHATE 10 MG/ML SOLUTION: Performed by: PHYSICIAN ASSISTANT

## 2022-02-23 PROCEDURE — 87880 STREP A ASSAY W/OPTIC: CPT | Performed by: PHYSICIAN ASSISTANT

## 2022-02-23 RX ORDER — DIPHENHYDRAMINE HYDROCHLORIDE AND LIDOCAINE HYDROCHLORIDE AND ALUMINUM HYDROXIDE AND MAGNESIUM HYDRO
10 KIT EVERY 6 HOURS
Status: DISCONTINUED | OUTPATIENT
Start: 2022-02-23 | End: 2022-02-23 | Stop reason: HOSPADM

## 2022-02-23 RX ORDER — DEXAMETHASONE SODIUM PHOSPHATE 10 MG/ML
10 INJECTION, SOLUTION INTRAMUSCULAR; INTRAVENOUS ONCE
Status: DISCONTINUED | OUTPATIENT
Start: 2022-02-23 | End: 2022-02-23

## 2022-02-23 RX ORDER — DIPHENHYDRAMINE, LIDOCAINE, NYSTATIN
5 KIT ORAL 4 TIMES DAILY
Qty: 60 ML | Refills: 1 | Status: SHIPPED | OUTPATIENT
Start: 2022-02-23

## 2022-02-23 RX ORDER — CEPHALEXIN 250 MG/1
500 CAPSULE ORAL ONCE
Status: COMPLETED | OUTPATIENT
Start: 2022-02-23 | End: 2022-02-23

## 2022-02-23 RX ORDER — CEPHALEXIN 500 MG/1
500 CAPSULE ORAL 4 TIMES DAILY
Qty: 28 CAPSULE | Refills: 0 | Status: SHIPPED | OUTPATIENT
Start: 2022-02-23 | End: 2022-03-02

## 2022-02-23 RX ORDER — DEXAMETHASONE SODIUM PHOSPHATE 10 MG/ML
10 INJECTION, SOLUTION INTRAMUSCULAR; INTRAVENOUS ONCE
Status: COMPLETED | OUTPATIENT
Start: 2022-02-23 | End: 2022-02-23

## 2022-02-23 RX ADMIN — CEPHALEXIN 500 MG: 250 CAPSULE ORAL at 19:11

## 2022-02-23 RX ADMIN — DEXAMETHASONE SODIUM PHOSPHATE 10 MG: 10 INJECTION INTRAMUSCULAR; INTRAVENOUS at 19:11

## 2022-02-23 RX ADMIN — DIPHENHYDRAMINE HYDROCHLORIDE AND LIDOCAINE HYDROCHLORIDE AND ALUMINUM HYDROXIDE AND MAGNESIUM HYDRO 10 ML: KIT at 19:11

## 2022-02-23 NOTE — DISCHARGE INSTRUCTIONS
Take antibiotic as prescribed.  Use Magic mouthwash as prescribed as needed for throat discomfort.  Follow-up with ENT if symptoms persist.  Return to the ER for any new or worsening symptoms.

## 2022-02-23 NOTE — ED PROVIDER NOTES
"Subjective   History of Present Illness   Patient is a 76-year-old female presenting to the ER with complaints of sore throat and swollen uvula after having a hysterectomy performed 2 days ago.  Patient states that she can feel her uvula touching her tongue and it is making it difficult for her to talk.  Patient states that she believes they damaged her uvula.  She denies any known fevers, vomiting, and additional symptoms or complaints at this time.  Patient is speaking without any major difficulty during interview.  She denies any tenderness to her neck and states that it is just her throat that is hurting.  She states she called Dr. Wilde's office today and he thought that she should have it looked at by the anesthesiologist.    Review of Systems   HENT: Positive for sore throat.    All other systems reviewed and are negative.      Past Medical History:   Diagnosis Date   • Arthritis     OA   • Body piercing     \"EARS ARE GROWN UP\"   • Cancer (HCC)     SKIN CANCER REMOVED FROM FACE   • Cataract, bilateral     s/p surgery   • GERD (gastroesophageal reflux disease)    • Hearing loss     bilateral hearing aids   • History of bronchitis    • History of cardiovascular stress test     REPORTS SEVERAL TEST. LAST DONE 25-30 YEARS AGO OR LONGER   • History of migraine    • Irregular heart beat     states had an episode in the summer of 2021 that her heart was racing, skipping beats, and vision went black.  stated it didn't last long and she never sought treatment.    • Migraine    • Osteoarthritis    • Ovarian cyst    • PONV (postoperative nausea and vomiting)    • Recurrent pregnancy loss, antepartum condition or complication     2   • Seasonal allergies    • Urinary, incontinence, stress female    • Wears dentures     upper plate    • Wears glasses     FOR READING       Allergies   Allergen Reactions   • Codeine Nausea And Vomiting   • Darvon [Propoxyphene] Other (See Comments)     Pt states \"it makes me feel weird\". " " States \"spaces me out\"       Past Surgical History:   Procedure Laterality Date   • APPENDECTOMY     • BREAST BIOPSY     • BREAST SURGERY Bilateral     BIOPSIES - benign   • COLONOSCOPY     • DILATATION AND CURETTAGE      REPORTS SEVERAL IN THE PAST   • EYE CAPSULOTOMY WITH LASER Left 7/10/2018    Procedure: EYE CAPSULOTOMY WITH LASER LEFT;  Surgeon: Jeri Singh MD;  Location: [facility];  Service: Ophthalmology   • EYE CAPSULOTOMY WITH LASER Right 7/17/2018    Procedure: EYE CAPSULOTOMY WITH LASER RIGHT;  Surgeon: Jeri Singh MD;  Location: [facility];  Service: Ophthalmology   • EYE SURGERY Bilateral     CATARACT EXTRACTIONS   • HYSTEROSCOPY N/A 2/21/2022    Procedure: HYSTEROSCOPY W DILATION AND CURETTAGE;  Surgeon: Jeyson Wilde MD;  Location: [facility];  Service: Obstetrics/Gynecology;  Laterality: N/A;   • MOUTH SURGERY      ORAL EXTRACTIONS   • SALPINGO OOPHORECTOMY Left    • SKIN BIOPSY      REPORTS REMOVED FROM FACE       Family History   Problem Relation Age of Onset   • Stroke Father    • Hypertension Father    • Skin cancer Father    • Heart disease Mother    • Hypertension Mother    • Hypertension Brother    • Lung cancer Brother    • Hypertension Sister    • Cervical cancer Maternal Aunt        Social History     Socioeconomic History   • Marital status:    Tobacco Use   • Smoking status: Never Smoker   • Smokeless tobacco: Never Used   Vaping Use   • Vaping Use: Never used   Substance and Sexual Activity   • Alcohol use: No   • Drug use: No   • Sexual activity: Defer           Objective   Physical Exam  Vitals and nursing note reviewed.   Constitutional:       General: She is not in acute distress.     Appearance: She is not toxic-appearing.   HENT:      Head: Normocephalic and atraumatic.      Nose: No congestion.      Mouth/Throat:      Pharynx: Oropharyngeal exudate (on uvula ), posterior oropharyngeal erythema and uvula swelling present.      Tonsils: No " tonsillar exudate or tonsillar abscesses.   Eyes:      Conjunctiva/sclera: Conjunctivae normal.   Cardiovascular:      Rate and Rhythm: Normal rate.      Heart sounds: Normal heart sounds.   Pulmonary:      Effort: Pulmonary effort is normal.      Breath sounds: Normal breath sounds.   Abdominal:      Palpations: Abdomen is soft.   Musculoskeletal:      Cervical back: Normal range of motion and neck supple.   Skin:     General: Skin is warm and dry.   Neurological:      General: No focal deficit present.      Mental Status: She is alert and oriented to person, place, and time.   Psychiatric:         Mood and Affect: Mood normal.         Behavior: Behavior normal.         Procedures           ED Course  ED Course as of 02/23/22 1931   Wed Feb 23, 2022 1931 Strep A Ag: Negative [AP]      ED Course User Index  [AP] Laya Osborne, VISHAL                                                 [facility]   Patient was evaluated in the ER for uvulitis after anesthesia for hysterectomy performed 2 days ago.  Patient is hemodynamically stable, no acute distress, nontoxic-appearing on exam.  Airway is patent and there is no voice changes or drooling, no deviation of the uvula on exam.  Uvula is swollen and inflamed with exudate.  Strep swab was performed and was negative.  Patient was given a dose of IM Decadron, a dose of Keflex, and Magic mouthwash.  She was given a prescription for Magic mouthwash and Keflex.  She was given information for ENT follow-up.  She was advised to follow-up with them if symptoms persist.  Precautions were given for return to the ER for any new or worsening symptoms.    Final diagnoses:   Uvulitis   Complication of anesthesia, initial encounter       ED Disposition  ED Disposition     ED Disposition Condition Comment    Discharge Stable           Jcarlos Craig MD  [address] [place] [id number] [phone]    Call   As needed    [facility]  [address] [place] [address]  [address]  [address]  [facility] [address]  [phone]  Schedule an appointment as soon as possible for a visit   for further evaluation of uvulitis if symptoms persist    [facility] Emergency Department  [address] [facility] [address] [phone]  Go to   As needed, If symptoms worsen         Medication List      New Prescriptions    cephalexin 500 MG capsule  Commonly known as: KEFLEX  Take 1 capsule by mouth 4 (Four) Times a Day for 7 days.     nystatin susp + lidocaine viscous  oral suspension  Commonly known as: MAGIC MOUTHWASH  Swish and swallow 5 mL 4 (Four) Times a Day.           Where to Get Your Medications      These medications were sent to [facility][id number] - [address] AT [facility] [phone]  - [phone] [address] [address]    Phone: [phone]   · cephalexin 500 MG capsule  · nystatin susp + lidocaine viscous  oral suspension          Laya Osborne, PAAnjelC  02/23/22 1934

## 2022-02-24 LAB
LAB AP CASE REPORT: NORMAL
PATH REPORT.FINAL DX SPEC: NORMAL

## 2022-02-25 LAB — BACTERIA SPEC AEROBE CULT: NORMAL

## 2022-03-02 ENCOUNTER — OFFICE VISIT (OUTPATIENT)
Dept: OTOLARYNGOLOGY | Facility: CLINIC | Age: 77
End: 2022-03-02

## 2022-03-02 VITALS
BODY MASS INDEX: 27.94 KG/M2 | WEIGHT: 148 LBS | HEIGHT: 61 IN | SYSTOLIC BLOOD PRESSURE: 122 MMHG | DIASTOLIC BLOOD PRESSURE: 78 MMHG

## 2022-03-02 DIAGNOSIS — K13.79 UVULAR SWELLING: Primary | ICD-10-CM

## 2022-03-02 PROCEDURE — 99203 OFFICE O/P NEW LOW 30 MIN: CPT | Performed by: OTOLARYNGOLOGY

## 2022-03-02 NOTE — PROGRESS NOTES
"       ENT Office Consult Note     Date of Consult: 2022     Patient Name: Taniya Thomas  MRN: [id number]   : 1945     Referring Provider: No ref. provider found    Care Team: Patient Care Team:  Sabrina Hatch DO as PCP - General (Family Medicine)     Chief Complaint:    Chief Complaint   Patient presents with   • Difficulty Swallowing     New patient in office for Uvula Damage.       History of Present Illness: Taniya Thomas is a 76 y.o. female who presents in consultation today for uvula swelling following D and C on 22.   Was placed on keflex, nystatin + lidocaine suspension, and steroid shot by ER on 22.        Subjective      Review of Systems:   Review of Systems   Constitutional: Positive for fatigue.   HENT: Positive for hearing loss, postnasal drip, sinus pressure and sneezing.    Eyes: Positive for redness and itching.   Respiratory: Positive for cough and shortness of breath.    Cardiovascular: Positive for palpitations.   Musculoskeletal: Positive for back pain, joint swelling, neck pain and neck stiffness.   Allergic/Immunologic: Positive for environmental allergies.   Neurological: Positive for dizziness.      I have reviewed and confirmed the accuracy of the ROS as documented by the MA/LPN/RN Manuel Mtz MD     Pertinent items are noted in HPI.     Past Medical History:   Past Medical History:   Diagnosis Date   • Arthritis     OA   • Body piercing     \"EARS ARE GROWN UP\"   • Cancer (HCC)     SKIN CANCER REMOVED FROM FACE   • Cataract, bilateral     s/p surgery   • GERD (gastroesophageal reflux disease)    • Hearing loss     bilateral hearing aids   • History of bronchitis    • History of cardiovascular stress test     REPORTS SEVERAL TEST. LAST DONE 25-30 YEARS AGO OR LONGER   • History of migraine    • Irregular heart beat     states had an episode in the summer of  that her heart was racing, skipping beats, and vision went black.  stated it didn't last long and " she never sought treatment.    • Migraine    • Osteoarthritis    • Ovarian cyst    • PONV (postoperative nausea and vomiting)    • Recurrent pregnancy loss, antepartum condition or complication     2   • Seasonal allergies    • Urinary, incontinence, stress female    • Wears dentures     upper plate    • Wears glasses     FOR READING       Past Surgical History:   Past Surgical History:   Procedure Laterality Date   • APPENDECTOMY     • BREAST BIOPSY     • BREAST SURGERY Bilateral     BIOPSIES - benign   • COLONOSCOPY     • DILATATION AND CURETTAGE      REPORTS SEVERAL IN THE PAST   • EYE CAPSULOTOMY WITH LASER Left 7/10/2018    Procedure: EYE CAPSULOTOMY WITH LASER LEFT;  Surgeon: Jeri Singh MD;  Location: [facility];  Service: Ophthalmology   • EYE CAPSULOTOMY WITH LASER Right 7/17/2018    Procedure: EYE CAPSULOTOMY WITH LASER RIGHT;  Surgeon: Jeri Singh MD;  Location: [facility];  Service: Ophthalmology   • EYE SURGERY Bilateral     CATARACT EXTRACTIONS   • HYSTEROSCOPY N/A 2/21/2022    Procedure: HYSTEROSCOPY W DILATION AND CURETTAGE;  Surgeon: Jeyson Wilde MD;  Location: [facility];  Service: Obstetrics/Gynecology;  Laterality: N/A;   • MOUTH SURGERY      ORAL EXTRACTIONS   • SALPINGO OOPHORECTOMY Left    • SKIN BIOPSY      REPORTS REMOVED FROM FACE       Family History:   Family History   Problem Relation Age of Onset   • Stroke Father    • Hypertension Father    • Skin cancer Father    • Heart disease Mother    • Hypertension Mother    • Hypertension Brother    • Lung cancer Brother    • Hypertension Sister    • Cervical cancer Maternal Aunt        Social History:   Social History     Socioeconomic History   • Marital status:    Tobacco Use   • Smoking status: Never Smoker   • Smokeless tobacco: Never Used   Vaping Use   • Vaping Use: Never used   Substance and Sexual Activity   • Alcohol use: No   • Drug use: No   • Sexual activity: Defer       Medications:  "    Current Outpatient Medications:   •  acetaminophen (TYLENOL) 500 MG tablet, Take 2 tablets by mouth Every 8 (Eight) Hours As Needed for Mild Pain ., Disp: 60 tablet, Rfl: 0  •  aspirin 81 MG EC tablet, Take 81 mg by mouth Daily., Disp: , Rfl:   •  ibuprofen (ADVIL,MOTRIN) 800 MG tablet, Take 1 tablet by mouth Every 8 (Eight) Hours As Needed for Moderate Pain, Disp: 30 tablet, Rfl: 0  •  multivitamin with minerals (CENTRUM SILVER 50+WOMEN PO), Take 1 tablet by mouth Daily., Disp: , Rfl:   •  vitamin B-12 (CYANOCOBALAMIN) 1000 MCG tablet, Take 1,000 mcg by mouth Daily., Disp: , Rfl:   •  cephalexin (KEFLEX) 500 MG capsule, Take 1 capsule by mouth 4 (Four) Times a Day for 7 days., Disp: 28 capsule, Rfl: 0  •  loratadine (Claritin) 10 MG tablet, Take 10 mg by mouth Daily., Disp: , Rfl:   •  nystatin susp + lidocaine viscous (MAGIC MOUTHWASH) oral suspension, Swish and swallow 5 mL 4 (Four) Times a Day., Disp: 60 mL, Rfl: 1    Allergies:   Allergies   Allergen Reactions   • Codeine Nausea And Vomiting   • Darvon [Propoxyphene] Other (See Comments)     Pt states \"it makes me feel weird\".  States \"spaces me out\"       Objective     Physical Exam:  Vital Signs:   Vitals:    03/02/22 1410   BP: 122/78   Weight: 67.1 kg (148 lb)   Height: 154.9 cm (61\")     Body mass index is 27.96 kg/m².     General Appearance:  healthy-appearing, well-nourished, and in no acute distress  Normal voice quality   Head:  Normocephalic, without obvious abnormality, atraumatic   Sinus: No maxillary tenderness   No frontal tenderness    Salivary Glands: No tenderness of the parotid glands or the submandibular glands and no parotid masses or submandibular masses  Normal saliva expressed from glands   Eyes:          Conjunctivae and sclerae normal, EOMI   Ear -  Left: EAC mild non obstructing cerumen  TM WNL, no perfs, no effusion    Ear - Right:  EAC clear  TM WNL, no perfs, no effusion    Nose: Septum relatively straight, no lesions   IT " normal bilaterally   No mucosal inflammation or edema   No drainage    Mouth: Lips WNL  MMM  No buccal lesions   Tongue, FOM WNL, no lesions, soft to palpation  Tonsils normal   Uvula with mild edema, small healing ulcer at tip   OP clear, no erythema or exudates   TMJ: No TMJ tenderness, no popping or cracking, symmetric opening   Neck: symmetrical and trachea midline  No thyroid nodules, no thyromegaly    Lungs:   Clear to auscultation, respirations regular, luciano and unlabored  No inc WOB    Heart: Regular rhythm and normal rate   Skin: Warm   Lymph nodes: No palpable cervical adenopathy   Neurologic: Cranial nerves 2 - 12 grossly intact     Alert and oriented   Appropriate mood and affect        Procedure:   Procedures      Assessment / Plan      Assessment/Plan:   Diagnoses and all orders for this visit:    1. Uvular swelling (Primary)         Uvula swelling post D/C procedure   Improving - expect given time will continue to resolve   Offered oral steroids, she declined   OK to continue lidocaine/nystatin rinses   F/u as needed      Follow Up:   Return if symptoms worsen or fail to improve.    Manuel Mtz MD

## 2022-03-07 ENCOUNTER — OFFICE VISIT (OUTPATIENT)
Dept: OBSTETRICS AND GYNECOLOGY | Facility: CLINIC | Age: 77
End: 2022-03-07

## 2022-03-07 VITALS
WEIGHT: 148 LBS | DIASTOLIC BLOOD PRESSURE: 74 MMHG | BODY MASS INDEX: 27.94 KG/M2 | HEIGHT: 61 IN | SYSTOLIC BLOOD PRESSURE: 136 MMHG

## 2022-03-07 DIAGNOSIS — Z09 POSTOPERATIVE FOLLOW-UP: Primary | ICD-10-CM

## 2022-03-07 PROCEDURE — 99024 POSTOP FOLLOW-UP VISIT: CPT | Performed by: PHYSICIAN ASSISTANT

## 2022-03-07 NOTE — PROGRESS NOTES
"Subjective   Chief Complaint   Patient presents with   • Post-op     2 weeks post op D&C hysteroscopy, no complaints.       Taniya Thomas is a 76 y.o. year old  presenting to be seen for post op visit  Doing well 2 weeks post op D&C hysteroscopy  No vaginal bleeding or spotting  Normal bowel and bladder function  Pathology benign endometrial polyp    Past Medical History:   Diagnosis Date   • Arthritis     OA   • Body piercing     \"EARS ARE GROWN UP\"   • Cancer (HCC)     SKIN CANCER REMOVED FROM FACE   • Cataract, bilateral     s/p surgery   • GERD (gastroesophageal reflux disease)    • Hearing loss     bilateral hearing aids   • History of bronchitis    • History of cardiovascular stress test     REPORTS SEVERAL TEST. LAST DONE 25-30 YEARS AGO OR LONGER   • History of migraine    • Irregular heart beat     states had an episode in the summer of  that her heart was racing, skipping beats, and vision went black.  stated it didn't last long and she never sought treatment.    • Migraine    • Osteoarthritis    • Ovarian cyst    • PONV (postoperative nausea and vomiting)    • Recurrent pregnancy loss, antepartum condition or complication     2   • Seasonal allergies    • Urinary, incontinence, stress female    • Wears dentures     upper plate    • Wears glasses     FOR READING        Current Outpatient Medications:   •  acetaminophen (TYLENOL) 500 MG tablet, Take 2 tablets by mouth Every 8 (Eight) Hours As Needed for Mild Pain ., Disp: 60 tablet, Rfl: 0  •  aspirin 81 MG EC tablet, Take 81 mg by mouth Daily., Disp: , Rfl:   •  ibuprofen (ADVIL,MOTRIN) 800 MG tablet, Take 1 tablet by mouth Every 8 (Eight) Hours As Needed for Moderate Pain, Disp: 30 tablet, Rfl: 0  •  loratadine (CLARITIN) 10 MG tablet, Take 10 mg by mouth Daily., Disp: , Rfl:   •  multivitamin with minerals tablet tablet, Take 1 tablet by mouth Daily., Disp: , Rfl:   •  nystatin susp + lidocaine viscous (MAGIC MOUTHWASH) oral suspension, Swish " "and swallow 5 mL 4 (Four) Times a Day., Disp: 60 mL, Rfl: 1  •  vitamin B-12 (CYANOCOBALAMIN) 1000 MCG tablet, Take 1,000 mcg by mouth Daily., Disp: , Rfl:    Allergies   Allergen Reactions   • Codeine Nausea And Vomiting   • Darvon [Propoxyphene] Other (See Comments)     Pt states \"it makes me feel weird\".  States \"spaces me out\"      Past Surgical History:   Procedure Laterality Date   • APPENDECTOMY     • BREAST BIOPSY     • BREAST SURGERY Bilateral     BIOPSIES - benign   • COLONOSCOPY     • DILATATION AND CURETTAGE      REPORTS SEVERAL IN THE PAST   • EYE CAPSULOTOMY WITH LASER Left 7/10/2018    Procedure: EYE CAPSULOTOMY WITH LASER LEFT;  Surgeon: Jeri Singh MD;  Location: [facility];  Service: Ophthalmology   • EYE CAPSULOTOMY WITH LASER Right 7/17/2018    Procedure: EYE CAPSULOTOMY WITH LASER RIGHT;  Surgeon: Jeri Singh MD;  Location: [facility];  Service: Ophthalmology   • EYE SURGERY Bilateral     CATARACT EXTRACTIONS   • HYSTEROSCOPY N/A 2/21/2022    Procedure: HYSTEROSCOPY W DILATION AND CURETTAGE;  Surgeon: Jeyson Wilde MD;  Location: [facility];  Service: Obstetrics/Gynecology;  Laterality: N/A;   • MOUTH SURGERY      ORAL EXTRACTIONS   • SALPINGO OOPHORECTOMY Left    • SKIN BIOPSY      REPORTS REMOVED FROM FACE      Social History     Socioeconomic History   • Marital status:    Tobacco Use   • Smoking status: Never Smoker   • Smokeless tobacco: Never Used   Vaping Use   • Vaping Use: Never used   Substance and Sexual Activity   • Alcohol use: No   • Drug use: No   • Sexual activity: Defer      Family History   Problem Relation Age of Onset   • Stroke Father    • Hypertension Father    • Skin cancer Father    • Heart disease Mother    • Hypertension Mother    • Hypertension Brother    • Lung cancer Brother    • Hypertension Sister    • Cervical cancer Maternal Aunt        Review of Systems   Constitutional: Negative for chills, diaphoresis and fever. " "  Gastrointestinal: Negative.    Genitourinary: Negative for difficulty urinating, dysuria, pelvic pain, vaginal bleeding, vaginal discharge and vaginal pain.           Objective   /74   Ht 154.9 cm (61\")   Wt 67.1 kg (148 lb)   LMP  (LMP Unknown)   BMI 27.96 kg/m²     Physical Exam       Result Review :                   Assessment and Plan  Diagnoses and all orders for this visit:    1. Postoperative follow-up (Primary)      Patient Instructions   RTO prn  Remain off estrogen therapy             This note was electronically signed.    Sabrina Krishnamurthy PA-C   March 7, 2022  "

## 2025-04-30 ENCOUNTER — HOSPITAL ENCOUNTER (OUTPATIENT)
Dept: GENERAL RADIOLOGY | Facility: HOSPITAL | Age: 80
Discharge: HOME | End: 2025-04-30
Admitting: STUDENT IN AN ORGANIZED HEALTH CARE EDUCATION/TRAINING PROGRAM
Payer: MEDICARE

## 2025-04-30 ENCOUNTER — PRE-ADMISSION TESTING (OUTPATIENT)
Dept: PREADMISSION TESTING | Facility: HOSPITAL | Age: 80
End: 2025-04-30
Payer: MEDICARE

## 2025-04-30 VITALS — BODY MASS INDEX: 26.45 KG/M2 | WEIGHT: 140 LBS

## 2025-04-30 LAB
ALBUMIN SERPL-MCNC: 4.2 G/DL (ref 3.5–5.2)
ALBUMIN/GLOB SERPL: 1.5 G/DL
ALP SERPL-CCNC: 71 U/L (ref 39–117)
ALT SERPL W P-5'-P-CCNC: 11 U/L (ref 1–33)
AMPHET+METHAMPHET UR QL: NEGATIVE
AMPHETAMINES UR QL: NEGATIVE
ANION GAP SERPL CALCULATED.3IONS-SCNC: 11.3 MMOL/L (ref 5–15)
AST SERPL-CCNC: 24 U/L (ref 1–32)
BACTERIA UR QL AUTO: ABNORMAL /HPF
BARBITURATES UR QL SCN: NEGATIVE
BENZODIAZ UR QL SCN: NEGATIVE
BILIRUB SERPL-MCNC: 0.3 MG/DL (ref 0–1.2)
BILIRUB UR QL STRIP: NEGATIVE
BUN SERPL-MCNC: 17 MG/DL (ref 8–23)
BUN/CREAT SERPL: 16.7 (ref 7–25)
BUPRENORPHINE SERPL-MCNC: NEGATIVE NG/ML
CALCIUM SPEC-SCNC: 9.7 MG/DL (ref 8.6–10.5)
CANNABINOIDS SERPL QL: NEGATIVE
CHLORIDE SERPL-SCNC: 106 MMOL/L (ref 98–107)
CLARITY UR: CLEAR
CO2 SERPL-SCNC: 23.7 MMOL/L (ref 22–29)
COCAINE UR QL: NEGATIVE
COLOR UR: YELLOW
CREAT SERPL-MCNC: 1.02 MG/DL (ref 0.57–1)
DEPRECATED RDW RBC AUTO: 42.8 FL (ref 37–54)
EGFRCR SERPLBLD CKD-EPI 2021: 55.4 ML/MIN/1.73
ERYTHROCYTE [DISTWIDTH] IN BLOOD BY AUTOMATED COUNT: 13.2 % (ref 12.3–15.4)
FENTANYL UR-MCNC: NEGATIVE NG/ML
GLOBULIN UR ELPH-MCNC: 2.8 GM/DL
GLUCOSE SERPL-MCNC: 89 MG/DL (ref 65–99)
GLUCOSE UR STRIP-MCNC: NEGATIVE MG/DL
HCT VFR BLD AUTO: 40.4 % (ref 34–46.6)
HGB BLD-MCNC: 13 G/DL (ref 12–15.9)
HGB UR QL STRIP.AUTO: NEGATIVE
HYALINE CASTS UR QL AUTO: ABNORMAL /LPF
KETONES UR QL STRIP: ABNORMAL
LEUKOCYTE ESTERASE UR QL STRIP.AUTO: ABNORMAL
MCH RBC QN AUTO: 28.4 PG (ref 26.6–33)
MCHC RBC AUTO-ENTMCNC: 32.2 G/DL (ref 31.5–35.7)
MCV RBC AUTO: 88.4 FL (ref 79–97)
METHADONE UR QL SCN: NEGATIVE
NITRITE UR QL STRIP: NEGATIVE
OPIATES UR QL: NEGATIVE
OXYCODONE UR QL SCN: NEGATIVE
PCP UR QL SCN: NEGATIVE
PH UR STRIP.AUTO: <=5 [PH] (ref 5–8)
PLATELET # BLD AUTO: 341 10*3/MM3 (ref 140–450)
PMV BLD AUTO: 9.2 FL (ref 6–12)
POTASSIUM SERPL-SCNC: 4.4 MMOL/L (ref 3.5–5.2)
PROT SERPL-MCNC: 7 G/DL (ref 6–8.5)
PROT UR QL STRIP: NEGATIVE
RBC # BLD AUTO: 4.57 10*6/MM3 (ref 3.77–5.28)
RBC # UR STRIP: ABNORMAL /HPF
REF LAB TEST METHOD: ABNORMAL
SODIUM SERPL-SCNC: 141 MMOL/L (ref 136–145)
SP GR UR STRIP: 1.02 (ref 1–1.03)
SQUAMOUS #/AREA URNS HPF: ABNORMAL /HPF
TRICYCLICS UR QL SCN: NEGATIVE
UROBILINOGEN UR QL STRIP: ABNORMAL
WBC # UR STRIP: ABNORMAL /HPF
WBC NRBC COR # BLD AUTO: 10.54 10*3/MM3 (ref 3.4–10.8)

## 2025-04-30 PROCEDURE — 81001 URINALYSIS AUTO W/SCOPE: CPT

## 2025-04-30 PROCEDURE — 85027 COMPLETE CBC AUTOMATED: CPT

## 2025-04-30 PROCEDURE — 80307 DRUG TEST PRSMV CHEM ANLYZR: CPT

## 2025-04-30 PROCEDURE — 71046 X-RAY EXAM CHEST 2 VIEWS: CPT

## 2025-04-30 PROCEDURE — 93005 ELECTROCARDIOGRAM TRACING: CPT

## 2025-04-30 PROCEDURE — 36415 COLL VENOUS BLD VENIPUNCTURE: CPT

## 2025-04-30 PROCEDURE — 80053 COMPREHEN METABOLIC PANEL: CPT

## 2025-04-30 RX ORDER — ACETAMINOPHEN 500 MG
500 TABLET ORAL EVERY 6 HOURS PRN
COMMUNITY

## 2025-04-30 RX ORDER — CHLORAL HYDRATE 500 MG
CAPSULE ORAL
COMMUNITY

## 2025-05-01 ENCOUNTER — ANESTHESIA (OUTPATIENT)
Dept: PERIOP | Facility: HOSPITAL | Age: 80
End: 2025-05-01
Payer: MEDICARE

## 2025-05-01 ENCOUNTER — HOSPITAL ENCOUNTER (OUTPATIENT)
Facility: HOSPITAL | Age: 80
Setting detail: HOSPITAL OUTPATIENT SURGERY
Discharge: HOME | End: 2025-05-01
Attending: STUDENT IN AN ORGANIZED HEALTH CARE EDUCATION/TRAINING PROGRAM | Admitting: STUDENT IN AN ORGANIZED HEALTH CARE EDUCATION/TRAINING PROGRAM
Payer: MEDICARE

## 2025-05-01 ENCOUNTER — ANESTHESIA EVENT (OUTPATIENT)
Dept: PERIOP | Facility: HOSPITAL | Age: 80
End: 2025-05-01
Payer: MEDICARE

## 2025-05-01 VITALS
RESPIRATION RATE: 16 BRPM | TEMPERATURE: 98.3 F | OXYGEN SATURATION: 97 % | BODY MASS INDEX: 26.45 KG/M2 | DIASTOLIC BLOOD PRESSURE: 71 MMHG | HEART RATE: 83 BPM | SYSTOLIC BLOOD PRESSURE: 160 MMHG | HEIGHT: 61 IN

## 2025-05-01 DIAGNOSIS — M65.339 TRIGGER MIDDLE FINGER, UNSPECIFIED LATERALITY: Primary | ICD-10-CM

## 2025-05-01 PROCEDURE — 25010000002 PROPOFOL 10 MG/ML EMULSION: Performed by: NURSE ANESTHETIST, CERTIFIED REGISTERED

## 2025-05-01 PROCEDURE — 25010000002 ONDANSETRON PER 1 MG: Performed by: NURSE ANESTHETIST, CERTIFIED REGISTERED

## 2025-05-01 PROCEDURE — 25010000002 DEXAMETHASONE PER 1 MG: Performed by: NURSE ANESTHETIST, CERTIFIED REGISTERED

## 2025-05-01 PROCEDURE — 25010000002 FENTANYL CITRATE (PF) 50 MCG/ML SOLUTION: Performed by: NURSE ANESTHETIST, CERTIFIED REGISTERED

## 2025-05-01 PROCEDURE — 25010000002 CEFAZOLIN PER 500 MG: Performed by: STUDENT IN AN ORGANIZED HEALTH CARE EDUCATION/TRAINING PROGRAM

## 2025-05-01 PROCEDURE — 25010000002 LIDOCAINE 1 % SOLUTION: Performed by: STUDENT IN AN ORGANIZED HEALTH CARE EDUCATION/TRAINING PROGRAM

## 2025-05-01 RX ORDER — LIDOCAINE HCL/PF 100 MG/5ML
SYRINGE (ML) INJECTION AS NEEDED
Status: DISCONTINUED | OUTPATIENT
Start: 2025-05-01 | End: 2025-05-01 | Stop reason: SURG

## 2025-05-01 RX ORDER — ONDANSETRON 2 MG/ML
INJECTION INTRAMUSCULAR; INTRAVENOUS AS NEEDED
Status: DISCONTINUED | OUTPATIENT
Start: 2025-05-01 | End: 2025-05-01 | Stop reason: SURG

## 2025-05-01 RX ORDER — FENTANYL CITRATE 50 UG/ML
INJECTION, SOLUTION INTRAMUSCULAR; INTRAVENOUS AS NEEDED
Status: DISCONTINUED | OUTPATIENT
Start: 2025-05-01 | End: 2025-05-01 | Stop reason: SURG

## 2025-05-01 RX ORDER — LIDOCAINE HYDROCHLORIDE 10 MG/ML
INJECTION, SOLUTION INFILTRATION; PERINEURAL AS NEEDED
Status: DISCONTINUED | OUTPATIENT
Start: 2025-05-01 | End: 2025-05-01 | Stop reason: HOSPADM

## 2025-05-01 RX ORDER — TRAMADOL HYDROCHLORIDE 50 MG/1
50 TABLET ORAL EVERY 6 HOURS PRN
Qty: 30 TABLET | Refills: 0 | Status: SHIPPED | OUTPATIENT
Start: 2025-05-01

## 2025-05-01 RX ORDER — SODIUM CHLORIDE, SODIUM LACTATE, POTASSIUM CHLORIDE, CALCIUM CHLORIDE 600; 310; 30; 20 MG/100ML; MG/100ML; MG/100ML; MG/100ML
30 INJECTION, SOLUTION INTRAVENOUS CONTINUOUS
Status: DISCONTINUED | OUTPATIENT
Start: 2025-05-01 | End: 2025-05-01 | Stop reason: HOSPADM

## 2025-05-01 RX ORDER — PROPOFOL 10 MG/ML
VIAL (ML) INTRAVENOUS AS NEEDED
Status: DISCONTINUED | OUTPATIENT
Start: 2025-05-01 | End: 2025-05-01 | Stop reason: SURG

## 2025-05-01 RX ORDER — DEXAMETHASONE SODIUM PHOSPHATE 4 MG/ML
INJECTION, SOLUTION INTRA-ARTICULAR; INTRALESIONAL; INTRAMUSCULAR; INTRAVENOUS; SOFT TISSUE AS NEEDED
Status: DISCONTINUED | OUTPATIENT
Start: 2025-05-01 | End: 2025-05-01 | Stop reason: SURG

## 2025-05-01 RX ORDER — SODIUM CHLORIDE 0.9 % (FLUSH) 0.9 %
10 SYRINGE (ML) INJECTION AS NEEDED
Status: DISCONTINUED | OUTPATIENT
Start: 2025-05-01 | End: 2025-05-01 | Stop reason: HOSPADM

## 2025-05-01 RX ADMIN — SODIUM CHLORIDE 2000 MG: 9 INJECTION, SOLUTION INTRAVENOUS at 07:48

## 2025-05-01 RX ADMIN — ONDANSETRON 4 MG: 2 INJECTION INTRAMUSCULAR; INTRAVENOUS at 07:54

## 2025-05-01 RX ADMIN — PROPOFOL 50 MG: 10 INJECTION, EMULSION INTRAVENOUS at 07:46

## 2025-05-01 RX ADMIN — PROPOFOL 100 MCG/KG/MIN: 10 INJECTION, EMULSION INTRAVENOUS at 07:48

## 2025-05-01 RX ADMIN — Medication 40 MG: at 07:46

## 2025-05-01 RX ADMIN — DEXAMETHASONE SODIUM PHOSPHATE 4 MG: 4 INJECTION, SOLUTION INTRA-ARTICULAR; INTRALESIONAL; INTRAMUSCULAR; INTRAVENOUS; SOFT TISSUE at 07:54

## 2025-05-01 RX ADMIN — FENTANYL CITRATE 50 MCG: 50 INJECTION, SOLUTION INTRAMUSCULAR; INTRAVENOUS at 07:46

## 2025-05-01 NOTE — H&P
Patient is an 79-year-old female with bilateral ring finger triggering.  Patient has presented to my office on several occasions for bilateral hand injections for her triggering.  However at this point she has failed nonoperative management and is ready for surgical A1 pulley release of bilateral middle fingers.  Patient has any acute problems or complaints at this time.  No change in past medical or surgical history.  She denies any fevers, chills, chest pain or shortness of breath.    Of systems negative other than those indicated in HPI    General Exam  No acute distress resting comfortably in the preop area.  AO x 3  CV: RRR  Pulm: Nonlabored    Physical exam of bilateral hands no abrasion, laceration, ecchymosis or edema.  There is tender nodule over the A1 pulley of bilateral middle fingers.  There is neurovascular intact distally.  SLT in all distributions.    Assessment  Bilateral middle trigger fingers    Plan  Proceed with bilateral A1 pulley release of bilateral middle fingers.  Patient will be discharged home postop.  Follow-up in clinic in 1 week for suture removal

## 2025-05-01 NOTE — ANESTHESIA POSTPROCEDURE EVALUATION
Patient: Taniya HOBBS Shafto    Procedure Summary       Date: 05/01/25 Room / Location: [facility] OR 1 /  [facility]    Anesthesia Start: 0736 Anesthesia Stop: 0818    Procedure: BILATERAL MIDDLE FINGER A1 PULLEY RELEASE (Bilateral: Fingers) Diagnosis:     Surgeons: Jarett Joy DO Provider: William Martines CRNA    Anesthesia Type: MAC ASA Status: 3            Anesthesia Type: MAC    Vitals  Vitals Value Taken Time   /71 05/01/25 08:47   Temp 98.3 °F (36.8 °C) 05/01/25 08:47   Pulse 83 05/01/25 08:47   Resp 16 05/01/25 08:47   SpO2 97 % 05/01/25 08:47           Post Anesthesia Care and Evaluation    Patient location during evaluation: PHASE II  Patient participation: complete - patient participated  Level of consciousness: awake and alert  Pain management: adequate    Airway patency: patent  Anesthetic complications: No anesthetic complications  PONV Status: none  Cardiovascular status: acceptable  Respiratory status: acceptable  Hydration status: acceptable  No anesthesia care post op

## 2025-05-01 NOTE — BRIEF OP NOTE
FINGER TRIGGER RELEASE  Progress Note    [place] FABY Shafto  5/1/2025    Pre-op Diagnosis:   Bilateral middle finger trigger finger       Post-Op Diagnosis Codes:  Same    Procedure(s):      Procedure(s):  BILATERAL MIDDLE FINGER A1 PULLEY RELEASE              Surgeon(s):  Jarett Joy DO    Anesthesia: Choice    Staff:   Circulator: Corrina Hernandez RN  Scrub Person: Etelvina Matt       Estimated Blood Loss: minimal    Urine Voided: * No values recorded between 5/1/2025  7:36 AM and 5/1/2025  8:13 AM *    Specimens:                None      Drains: * No LDAs found *    Findings: Bilateral trigger fingers of the middle finger      Complications: None          Jarett Joy DO     Date: 5/1/2025  Time: 08:18 EDT

## 2025-05-01 NOTE — ANESTHESIA PREPROCEDURE EVALUATION
Anesthesia Evaluation     Patient summary reviewed and Nursing notes reviewed   history of anesthetic complications:  PONV  NPO Solid Status: > 8 hours  NPO Liquid Status: > 8 hours           Airway   Mallampati: II  TM distance: >3 FB  Neck ROM: full  no difficulty expected  Dental    (+) upper dentures    Comment: Lower bridge    Pulmonary - negative pulmonary ROS and normal exam   Cardiovascular - negative cardio ROS and normal exam    Rhythm: regular  Rate: normal        Neuro/Psych  (+) headaches  GI/Hepatic/Renal/Endo    (+) GERD    Musculoskeletal     Abdominal    Substance History - negative use     OB/GYN negative ob/gyn ROS         Other   arthritis,   history of cancer    ROS/Med Hx Other: Hx skin cancer.                    Anesthesia Plan    ASA 3     MAC     (Pt told that intravenous sedation will be used as the primary anesthetic along with local anesthesia if necessary. Every effort will be made to make sure the patient is comfortable.     The patient was told they may or may not have recall for the procedure. It was further explained that if the MAC was not adequate that a general anesthetic with either an LMA or endotracheal tube would be required.     Will proceed with the plan of care.)  intravenous induction     Anesthetic plan, risks, benefits, and alternatives have been provided, discussed and informed consent has been obtained with: patient.  Pre-procedure education provided      CODE STATUS:

## 2025-05-02 LAB
QT INTERVAL: 400 MS
QTC INTERVAL: 456 MS

## 2025-05-05 NOTE — OP NOTE
GI Discharge Instructions Endoscopy      5/12/2023    During your exam, the physician:    Removed 4 colon polyps  Obtained biopsies from the esophagus, stomach, small intestine and colon    DIET INSTRUCTIONS:  Advance your diet as you tolerate it    PRESCRIPTIONS/MEDICATIONS  No new prescriptions given today    A RESPONSIBLE ADULT MUST ACCOMPANY YOU AND DRIVE YOU HOME    You had the following procedure(s) today:   Colonoscopy and Upper Endoscopy      Avoid anti-inflammatory drugs, (Nuprin, Ibuprofen, Motrin, Advil, etc.) for 3 days.     Following sedation, your judgement, perception and coordination are impaired for a minimum of       24 hours.      Therefore:  Do not drive.  Do not return to work today.  Do not operate appliances or machinery that require quick reaction time  Do not sign legal documents or be involved in work decisions  Do not smoke or drink alcoholic beverages for 24 hours  Plan to spend a few hours resting before resuming your normal routine    Please call your physician in the event that you experience any of the following or proceed to  the nearest hospital in the event of an emergency:     For Upper Endoscopy  Difficulty swallowing or breathing  Neck swelling  Excessive pain, you may have mild chest pain or discomfort which should pass in 1-2 hours with the passage of air.  Nausea or vomiting  Abdominal distention  Fever  Mild throat soreness may follow this procedure.  Warm salt-water gargle or lozenges may relieve your discomfort    For Colonoscopy / Sigmoidoscopy  Severe abdominal distention or pain. Some mild distention and/or cramping are normal after these procedures but should pass within an hour or two with the passage of air.  Rectal bleeding more than blood streaking on the toilet tissue  Nausea or vomiting  Fever    If you have any questions or concerns, contact Dr. Haile's office [place] [phone]    ADDITIONAL INSTRUCTIONS: none     Patient Name: Taniya Thomas  MRN: [id number]  Date of Procedure: 5/1/25  Surgeon: Jarett Joy  Procedure: Bilateral Middle Finger A1 Pulley Release  Indication: Chronic triggering of the bilateral middle finger.   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    Anesthesia:  Local with sedation.    Patient Positioning:  The patient was placed in a supine position with the arms extended and supported on an arm board.    Description of Procedure:  Preparation and Anesthesia: A tourniquet was applied but not inflated during the procedure. The right hand and forearm were prepped and draped in the usual sterile fashion. A this point, a timeout was made identifying the patient, extremity and finger involved. Local anesthetic was injected into the palmar aspect of the right hand over the A1 pulley region for analgesia. A lead hand was applied to isolate the middle finger for ease of manipulation.A 1-2 cm incision was made over the volar aspect of the right middle finger at the A1 pulley level. The incision was carried down through subcutaneous tissue, and the A1 pulley was identified. Care was taken to visualize surrounding structures, including the flexor tendons and digital nerves. The A1 pulley was carefully dissected and released from its attachments to allow free movement of the flexor tendon. The tendons were pulled through the wound to assess the integrity and release adhesion, starting with the FDS and then the FDP. Hemostasis was achieved using electrocautery as needed. The wound was irrigated, and the subcutaneous tissue was closed with 3-0 nylon sutures. A sterile dressing was applied to the wound.    Attention was then turned to the left side. The left hand and forearm were prepped and draped in the usual sterile fashion. A this point, a timeout was made identifying the patient, extremity and finger involved. Local  anesthetic was injected into the palmar aspect of the right hand over the A1 pulley region for analgesia. A lead hand was applied to isolate the middle finger for ease of manipulation.A 1-2 cm incision was made over the volar aspect of the right middle finger at the A1 pulley level. The incision was carried down through subcutaneous tissue, and the A1 pulley was identified. Care was taken to visualize surrounding structures, including the flexor tendons and digital nerves. The A1 pulley was carefully dissected and released from its attachments to allow free movement of the flexor tendon. The tendons were pulled through the wound to assess the integrity and release adhesion, starting with the FDS and then the FDP. Hemostasis was achieved using electrocautery as needed. The wound was irrigated, and the subcutaneous tissue was closed with 3-0 nylon sutures. A sterile dressing was applied to the wound.    Estimated Blood Loss:  Minimal.    Complications:  None.    Postoperative Instructions:  The patient is to keep the hand elevated to minimize swelling. A follow-up appointment is scheduled for suture removal and assessment of healing. Occupational therapy may be recommended for continued rehabilitation.

## (undated) DEVICE — SEAL HYSTERSCOPE/OUTFLOW CHANNEL MYOSURE

## (undated) DEVICE — PADDING,UNDERCAST,COTTON, 4"X4YD STERILE: Brand: MEDLINE

## (undated) DEVICE — GLV SURG SENSICARE PI LF PF 8 GRN STRL

## (undated) DEVICE — GLV SURG SENSICARE W/ALOE PF LF 8 STRL

## (undated) DEVICE — RICH MINOR LITHOTOMY: Brand: MEDLINE INDUSTRIES, INC.

## (undated) DEVICE — GLV SURG SENSICARE PI LF PF 7.5 GRN STRL

## (undated) DEVICE — SPNG GZ WOVN 4X4IN 12PLY 10/BX STRL

## (undated) DEVICE — PREMIUM WET SKIN PREP TRAY CHG: Brand: MEDLINE INDUSTRIES, INC.

## (undated) DEVICE — SLV SCD CALF HEMOFORCE DVT THERP REPROC MD

## (undated) DEVICE — SOL SCRUB STAT ENDURE 420 CIDASTAT 2PCT FM 4OZ

## (undated) DEVICE — SOL IRR H2O BTL 1000ML STRL

## (undated) DEVICE — PK EXTRM UPPR 20

## (undated) DEVICE — BNDG ELAS MATRX V/CLS 4IN 5YD LF

## (undated) DEVICE — ST IRR CYSTO W/SPK 77IN LF

## (undated) DEVICE — ADHESIVE ISLAND DRESSING: Brand: TELFA

## (undated) DEVICE — PATIENT RETURN ELECTRODE, SINGLE-USE, CONTACT QUALITY MONITORING, ADULT, WITH 9FT CORD, FOR PATIENTS WEIGING OVER 33LBS. (15KG): Brand: MEGADYNE

## (undated) DEVICE — GLV SURG BIOGEL PI ULTRATOUCH G SZ7.5 LF

## (undated) DEVICE — MARKR SKIN W/RULR

## (undated) DEVICE — TUBING, SUCTION, 1/4" X 12', STRAIGHT: Brand: MEDLINE

## (undated) DEVICE — DRAPE UNDERBUTTOCKS W FLUID POUCH 40X44IN

## (undated) DEVICE — SUT ETHLN 3/0 FS1 663G

## (undated) DEVICE — BLD SCLPL BEAVR MINI STR 2BVL 180D LF

## (undated) DEVICE — DRESSING,GAUZE,XEROFORM,CURAD,1"X8",ST: Brand: CURAD